# Patient Record
Sex: MALE | Race: WHITE | NOT HISPANIC OR LATINO | Employment: UNEMPLOYED | URBAN - METROPOLITAN AREA
[De-identification: names, ages, dates, MRNs, and addresses within clinical notes are randomized per-mention and may not be internally consistent; named-entity substitution may affect disease eponyms.]

---

## 2017-10-19 ENCOUNTER — ALLSCRIPTS OFFICE VISIT (OUTPATIENT)
Dept: OTHER | Facility: OTHER | Age: 27
End: 2017-10-19

## 2017-10-19 DIAGNOSIS — R53.83 OTHER FATIGUE: ICD-10-CM

## 2017-11-21 ENCOUNTER — GENERIC CONVERSION - ENCOUNTER (OUTPATIENT)
Dept: OTHER | Facility: OTHER | Age: 27
End: 2017-11-21

## 2018-01-11 NOTE — PROGRESS NOTES
Assessment    1  Encounter for preventive health examination (V70 0) (Z00 00)   2  Cigarette smoker (305 1) (F17 210)   3  Current every day smoker (305 1) (F17 200)   4  Fatigue (780 79) (R53 83)   5  Encounter for preventive health examination (V70 0) (Z00 00)    Plan  Fatigue    · (1) CBC/PLT/DIFF; Status:Active; Requested [id number];    · (1) COMPREHENSIVE METABOLIC PANEL; Status:Active; Requested [id number];    · (1) LIPID PANEL, FASTING; Status:Active; Requested [id number];    · (1) T4, FREE; Status:Active; Requested [id number];    · (1) TSH; Status:Active; Requested [id number];    · (1) URIC ACID; Status:Active; Requested [id number];     Discussion/Summary    Physical exam completed  Smoking cessation discussed, pt rolls his own cigarettes  Paperwork for [facility] completed  Labwork ordered  Follow up in 1 mos to review labwork    The patient was counseled regarding risks and benefits of treatment options  total time of encounter was 30 min minutes and 10 min minutes was spent counseling  Possible side effects of new medications were reviewed with the patient/guardian today  The treatment plan was reviewed with the patient/guardian  The patient/guardian understands and agrees with the treatment plan      Chief Complaint  Needs annual physical      History of Present Illness  HM, Adult Male: The patient is being seen for a health maintenance evaluation  The last health maintenance visit was no PCP  General Health: The patient's health since the last visit is described as good  He does not have regular dental visits  He denies vision problems  He denies hearing loss  Lifestyle:  He consumes a diverse and healthy diet  He does not have any weight concerns  He does not exercise regularly  He uses tobacco  He denies alcohol use  He denies drug use  Reproductive health:  the patient is sexually active  He denies erectile dysfunction     Screening:   HPI: Pt here to establish care,  completion of health forms for [facility] reports no complaints or medical problems is on no medication, is full time employee of [employer] firm  lives with spouse and 1 yr old son, is seeking custody of his own son in foster care in [place]       Review of Systems    Constitutional: no fever, not feeling poorly, no chills and not feeling tired  Eyes: eyesight problems and wears glasses  ENT: no earache, no sore throat, no hearing loss and no hoarseness  Cardiovascular: no chest pain, no palpitations and no extremity edema  Respiratory: no shortness of breath, no cough, no wheezing and no shortness of breath during exertion  Gastrointestinal: no abdominal pain, no nausea and no diarrhea  Genitourinary: no dysuria and no urinary hesitancy  Musculoskeletal: no arthralgias, no joint swelling, no myalgias and no joint stiffness  The patient presents with complaints of a rash (skin rash intermittently beneath his breasts)  Neurological: no headache, no confusion and no fainting  Psychiatric: no anxiety, no sleep disturbances and no depression  Endocrine: No complaints of proptosis, no hot flashes, no muscle weakness, no erectile dysfunction, no deepening of the voice, no feelings of weakness  Hematologic/Lymphatic: no swollen glands and no swollen glands in the neck  ROS reviewed  Family History  Mother    · Family history of lung cancer (V16 1) (Z80 1)  Father    · Family history of type 2 diabetes mellitus (V18 0) (Z83 3)    Social History    · Cigarette smoker (305 1) (F17 210)   · Current every day smoker (305 1) (F17 200)   · Employed   ·    · No illicit drug use   · Social drinker (V49 89) (Z78 9)   · Two children    Current Meds   1   No Reported Medications Recorded    Vitals   Recorded: 19Oct2017 10:47AM   Temperature 96 8 F   Heart Rate 65   Systolic 576   Diastolic 80   Height 6 ft    Weight 243 lb    BMI Calculated 32 96   BSA Calculated 2 31   O2 Saturation 98     Physical Exam    Constitutional   General appearance: Abnormal   appears healthy, normal body odor and overweight  c/o pain related to poor dentition  Head and Face   Head and face: Normal     Palpation of the face and sinuses: No sinus tenderness  Eyes   Conjunctiva and lids: No erythema, swelling or discharge  Pupils and irises: Equal, round, reactive to light  Ophthalmoscopic examination: Normal fundi and optic discs  Ears, Nose, Mouth, and Throat   External inspection of ears and nose: Normal     Otoscopic examination: Tympanic membranes translucent with normal light reflex  Canals patent without erythema  Hearing: Normal     Nasal mucosa, septum, and turbinates: Normal without edema or erythema  Lips, teeth, and gums: Normal, good dentition  Oropharynx: Normal with no erythema, edema, exudate or lesions  Neck   Neck: Supple, symmetric, trachea midline, no masses  Thyroid: Normal, no thyromegaly  Pulmonary   Respiratory effort: No increased work of breathing or signs of respiratory distress  Percussion of chest: Normal     Palpation of chest: Normal     Auscultation of lungs: Clear to auscultation  Cardiovascular   Palpation of heart: Normal PMI, no thrills  Auscultation of heart: Normal rate and rhythm, normal S1 and S2, no murmurs  The heart rate was normal  Heart sounds: normal S1, normal S2, no S3 and no S4  no murmurs were heard  Chest   Chest: Normal     Abdomen   Abdomen: Non-tender, no masses  Liver and spleen: No hepatomegaly or splenomegaly  Lymphatic   Palpation of lymph nodes in neck: No lymphadenopathy  Musculoskeletal   Gait and station: Normal     Inspection/palpation of digits and nails: Normal without clubbing or cyanosis      Inspection/palpation of joints, bones, and muscles: Normal     Range of motion: Normal     Stability: Normal     Muscle strength/tone: Normal     Skin   Skin and subcutaneous tissue: Normal without rashes or lesions  Examination of the skin for lesions: Abnormal   Raised mole on pts scalp line L side and Abd  Examination for skin lesions: Abnormal   Skin Lesions: Actinic Keratosis: pink cm pink, scaling macules/papules noted  Palpation of skin and subcutaneous tissue: Normal turgor  Neurologic   Cranial nerves: Cranial nerves 2-12 intact  Psychiatric   Judgment and insight: Normal     Orientation to person, place and time: Normal     Recent and remote memory: Intact      Mood and affect: Normal        Future Appointments    Date/Time Provider Specialty Site   11/21/2017 10:30 AM Candiss Lesches, [address]  Internal Medicine [facility]     Signatures   Electronically signed by : Erika Thompson; Oct 19 2017 12:23PM EST                       (Author)    Electronically signed by : Kyle Baldwin DO; Oct 19 2017  4:44PM EST

## 2018-01-12 NOTE — MISCELLANEOUS
Provider Comments  Provider Comments:   Patient was a no show for his appointment today        Signatures   Electronically signed by : Travis Hare DO; Nov 22 2017 10:55AM EST

## 2018-01-14 VITALS
WEIGHT: 243 LBS | HEART RATE: 65 BPM | SYSTOLIC BLOOD PRESSURE: 120 MMHG | OXYGEN SATURATION: 98 % | BODY MASS INDEX: 32.91 KG/M2 | HEIGHT: 72 IN | DIASTOLIC BLOOD PRESSURE: 80 MMHG | TEMPERATURE: 96.8 F

## 2018-02-05 ENCOUNTER — APPOINTMENT (OUTPATIENT)
Dept: LAB | Facility: CLINIC | Age: 28
End: 2018-02-05
Payer: COMMERCIAL

## 2018-02-05 DIAGNOSIS — R53.83 OTHER FATIGUE: ICD-10-CM

## 2018-02-05 LAB
ALBUMIN SERPL BCP-MCNC: 4 G/DL (ref 3.5–5)
ALP SERPL-CCNC: 81 U/L (ref 46–116)
ALT SERPL W P-5'-P-CCNC: 40 U/L (ref 12–78)
ANION GAP SERPL CALCULATED.3IONS-SCNC: 5 MMOL/L (ref 4–13)
AST SERPL W P-5'-P-CCNC: 16 U/L (ref 5–45)
BASOPHILS # BLD AUTO: 0.02 THOUSANDS/ΜL (ref 0–0.1)
BASOPHILS NFR BLD AUTO: 0 % (ref 0–1)
BILIRUB SERPL-MCNC: 0.38 MG/DL (ref 0.2–1)
BUN SERPL-MCNC: 8 MG/DL (ref 5–25)
CALCIUM SERPL-MCNC: 8.8 MG/DL (ref 8.3–10.1)
CHLORIDE SERPL-SCNC: 107 MMOL/L (ref 100–108)
CHOLEST SERPL-MCNC: 159 MG/DL (ref 50–200)
CO2 SERPL-SCNC: 28 MMOL/L (ref 21–32)
CREAT SERPL-MCNC: 0.95 MG/DL (ref 0.6–1.3)
EOSINOPHIL # BLD AUTO: 0.21 THOUSAND/ΜL (ref 0–0.61)
EOSINOPHIL NFR BLD AUTO: 3 % (ref 0–6)
ERYTHROCYTE [DISTWIDTH] IN BLOOD BY AUTOMATED COUNT: 12.9 % (ref 11.6–15.1)
GFR SERPL CREATININE-BSD FRML MDRD: 108 ML/MIN/1.73SQ M
GLUCOSE P FAST SERPL-MCNC: 91 MG/DL (ref 65–99)
HCT VFR BLD AUTO: 49.8 % (ref 36.5–49.3)
HDLC SERPL-MCNC: 41 MG/DL (ref 40–60)
HGB BLD-MCNC: 16.7 G/DL (ref 12–17)
LDLC SERPL CALC-MCNC: 100 MG/DL (ref 0–100)
LYMPHOCYTES # BLD AUTO: 2.36 THOUSANDS/ΜL (ref 0.6–4.47)
LYMPHOCYTES NFR BLD AUTO: 29 % (ref 14–44)
MCH RBC QN AUTO: 30.9 PG (ref 26.8–34.3)
MCHC RBC AUTO-ENTMCNC: 33.5 G/DL (ref 31.4–37.4)
MCV RBC AUTO: 92 FL (ref 82–98)
MONOCYTES # BLD AUTO: 0.95 THOUSAND/ΜL (ref 0.17–1.22)
MONOCYTES NFR BLD AUTO: 12 % (ref 4–12)
NEUTROPHILS # BLD AUTO: 4.6 THOUSANDS/ΜL (ref 1.85–7.62)
NEUTS SEG NFR BLD AUTO: 56 % (ref 43–75)
NRBC BLD AUTO-RTO: 0 /100 WBCS
PLATELET # BLD AUTO: 333 THOUSANDS/UL (ref 149–390)
PMV BLD AUTO: 9.5 FL (ref 8.9–12.7)
POTASSIUM SERPL-SCNC: 4.7 MMOL/L (ref 3.5–5.3)
PROT SERPL-MCNC: 7.3 G/DL (ref 6.4–8.2)
RBC # BLD AUTO: 5.41 MILLION/UL (ref 3.88–5.62)
SODIUM SERPL-SCNC: 140 MMOL/L (ref 136–145)
T4 FREE SERPL-MCNC: 1.01 NG/DL (ref 0.76–1.46)
TRIGL SERPL-MCNC: 90 MG/DL
TSH SERPL DL<=0.05 MIU/L-ACNC: 0.46 UIU/ML (ref 0.36–3.74)
URATE SERPL-MCNC: 4.7 MG/DL (ref 4.2–8)
WBC # BLD AUTO: 8.16 THOUSAND/UL (ref 4.31–10.16)

## 2018-02-05 PROCEDURE — 85025 COMPLETE CBC W/AUTO DIFF WBC: CPT

## 2018-02-05 PROCEDURE — 80053 COMPREHEN METABOLIC PANEL: CPT

## 2018-02-05 PROCEDURE — 84443 ASSAY THYROID STIM HORMONE: CPT

## 2018-02-05 PROCEDURE — 80061 LIPID PANEL: CPT

## 2018-02-05 PROCEDURE — 84439 ASSAY OF FREE THYROXINE: CPT

## 2018-02-05 PROCEDURE — 84550 ASSAY OF BLOOD/URIC ACID: CPT

## 2018-02-05 PROCEDURE — 36415 COLL VENOUS BLD VENIPUNCTURE: CPT

## 2018-02-06 ENCOUNTER — OFFICE VISIT (OUTPATIENT)
Dept: FAMILY MEDICINE CLINIC | Facility: CLINIC | Age: 28
End: 2018-02-06
Payer: COMMERCIAL

## 2018-02-06 VITALS
TEMPERATURE: 98.8 F | HEIGHT: 74 IN | HEART RATE: 64 BPM | BODY MASS INDEX: 32.6 KG/M2 | SYSTOLIC BLOOD PRESSURE: 140 MMHG | DIASTOLIC BLOOD PRESSURE: 80 MMHG | OXYGEN SATURATION: 98 % | WEIGHT: 254 LBS

## 2018-02-06 DIAGNOSIS — F32.1 MODERATE SINGLE CURRENT EPISODE OF MAJOR DEPRESSIVE DISORDER (HCC): ICD-10-CM

## 2018-02-06 DIAGNOSIS — F17.200 SMOKING: ICD-10-CM

## 2018-02-06 DIAGNOSIS — R07.9 CHEST PAIN, UNSPECIFIED TYPE: Primary | ICD-10-CM

## 2018-02-06 DIAGNOSIS — R00.2 HEART PALPITATIONS: ICD-10-CM

## 2018-02-06 DIAGNOSIS — F12.10 DRUG ABUSE, MARIJUANA: ICD-10-CM

## 2018-02-06 PROCEDURE — 99214 OFFICE O/P EST MOD 30 MIN: CPT | Performed by: FAMILY MEDICINE

## 2018-02-06 NOTE — LETTER
February 6, 2018     Patient: Corinne Lobo   YOB: 1990   Date of Visit: 2/6/2018       To Whom it May Concern:    Chuyitataylor Garima is under my professional care  He was seen in my office on 2/6/2018  He may return to work on 02/07/2018  Excuse from work 02/5/2018-02/06/2018    If you have any questions or concerns, please don't hesitate to call           Sincerely,          Anastasia Vargas DO        CC: No Recipients

## 2018-02-06 NOTE — PROGRESS NOTES
Assessment/Plan:    No problem-specific Assessment & Plan notes found for this encounter  Diagnoses and all orders for this visit:    Chest pain, unspecified type  -     Echo stress test w contrast if indicated; Future    Drug abuse, marijuana    Heart palpitations    Smoking  -     XR chest pa & lateral; Future    Moderate single current episode of major depressive disorder (HCC)  -     sertraline (ZOLOFT) 50 mg tablet; Take 1 tablet (50 mg total) by mouth daily          Subjective:      Patient ID: Cynthia España is a 28 y o  male  Pt complains of headaches, fatigue, nausea, when he withdrawls from smoking marajana, pt is used to smoking marajana daily and he has depression and heart palpitations within 24hrs of not smoking "weed", pt has been using Valeriy daily since the age of 14        The following portions of the patient's history were reviewed and updated as appropriate: allergies, current medications, past family history, past medical history, past social history, past surgical history and problem list     Review of Systems   Constitutional: Negative for chills, fever and unexpected weight change  Cardiovascular: Positive for chest pain and palpitations  Objective:     Physical Exam   Constitutional: He is oriented to person, place, and time  He appears well-developed and well-nourished  No distress  HENT:   Head: Normocephalic and atraumatic  Neck: Normal range of motion  Neck supple  Cardiovascular: Normal rate, regular rhythm and normal heart sounds  No murmur heard  Pulmonary/Chest: Effort normal and breath sounds normal  No stridor  No respiratory distress  He has no wheezes  He has no rales  Lymphadenopathy:     He has no cervical adenopathy  Neurological: He is alert and oriented to person, place, and time  Skin: Skin is warm and dry  No rash noted  He is not diaphoretic  No erythema  No pallor  Psychiatric: He has a normal mood and affect   His behavior is normal  Judgment and thought content normal    Nursing note and vitals reviewed

## 2018-03-08 ENCOUNTER — OFFICE VISIT (OUTPATIENT)
Dept: FAMILY MEDICINE CLINIC | Facility: CLINIC | Age: 28
End: 2018-03-08
Payer: COMMERCIAL

## 2018-03-08 VITALS
DIASTOLIC BLOOD PRESSURE: 80 MMHG | SYSTOLIC BLOOD PRESSURE: 128 MMHG | HEART RATE: 74 BPM | TEMPERATURE: 98.8 F | HEIGHT: 74 IN | WEIGHT: 249.25 LBS | BODY MASS INDEX: 31.99 KG/M2 | OXYGEN SATURATION: 97 %

## 2018-03-08 DIAGNOSIS — F17.200 SMOKING: ICD-10-CM

## 2018-03-08 DIAGNOSIS — F12.10 DRUG ABUSE, MARIJUANA: ICD-10-CM

## 2018-03-08 DIAGNOSIS — F32.1 MODERATE SINGLE CURRENT EPISODE OF MAJOR DEPRESSIVE DISORDER (HCC): ICD-10-CM

## 2018-03-08 DIAGNOSIS — R07.9 CHEST PAIN, UNSPECIFIED TYPE: Primary | ICD-10-CM

## 2018-03-08 PROCEDURE — 99214 OFFICE O/P EST MOD 30 MIN: CPT | Performed by: FAMILY MEDICINE

## 2018-03-08 NOTE — PROGRESS NOTES
Assessment/Plan:    No problem-specific Assessment & Plan notes found for this encounter  Diagnoses and all orders for this visit:    Chest pain, unspecified type  Comments:  anxiety related but pt had a negative stress test    Drug abuse, marijuana  Comments:  pt is not currently using marijuana in over 2 weeks, continue zoloft    Moderate single current episode of major depressive disorder ([facility] [address] )  Comments:  marked improvement with zoloft pt does not want to increase it at this time, kevin in 6 weeks    Smoking  Comments:  pt counseled on quitting smoking          Subjective:      Patient ID: Corinne Lobo is a 28 y o  male  Follow up for chest pain pt was sent for a stress echo test the test was negative, pt does get mild chest pain when he is overly excitied, follow up for drug abuse pt has been a daily marijuana user, pt has not smoked marijuana in over 2 weeks since starting the zoloft, follow up for depression pt was placed on zoloft 50mg daily at last visit, pt is doing better on the zoloft but is not 100%, pt continues to smoke cigarettes        The following portions of the patient's history were reviewed and updated as appropriate: allergies, current medications, past family history, past medical history, past social history, past surgical history and problem list     Review of Systems   Constitutional: Negative for chills, fever and unexpected weight change  Respiratory: Negative for shortness of breath and wheezing  Gastrointestinal: Negative for nausea and vomiting  Objective:      /80   Pulse 74   Temp 98 8 °F (37 1 °C)   Ht 6' 2" (1 88 m)   Wt 113 kg (249 lb 4 oz)   SpO2 97%   BMI 32 00 kg/m²          Physical Exam   Constitutional: He is oriented to person, place, and time  He appears well-developed and well-nourished  No distress  HENT:   Head: Normocephalic and atraumatic     Right Ear: External ear normal    Left Ear: External ear normal    Nose: Nose normal  Mouth/Throat: Oropharynx is clear and moist    Eyes: Conjunctivae and EOM are normal  Pupils are equal, round, and reactive to light  No scleral icterus  Neck: Normal range of motion  Neck supple  Cardiovascular: Normal rate, regular rhythm and normal heart sounds  Exam reveals no friction rub  No murmur heard  Pulmonary/Chest: Effort normal and breath sounds normal  No respiratory distress  He has no wheezes  He has no rales  Abdominal: Soft  Bowel sounds are normal  He exhibits no distension and no mass  There is no tenderness  There is no rebound and no guarding  Musculoskeletal: Normal range of motion  He exhibits no edema  Lymphadenopathy:     He has no cervical adenopathy  Neurological: He is alert and oriented to person, place, and time  He has normal reflexes  Skin: Skin is warm and dry  No rash noted  He is not diaphoretic  No erythema  No pallor  Psychiatric: He has a normal mood and affect   His behavior is normal  Judgment and thought content normal

## 2018-03-08 NOTE — PATIENT INSTRUCTIONS

## 2018-07-03 ENCOUNTER — OFFICE VISIT (OUTPATIENT)
Dept: FAMILY MEDICINE CLINIC | Facility: CLINIC | Age: 28
End: 2018-07-03
Payer: COMMERCIAL

## 2018-07-03 VITALS
SYSTOLIC BLOOD PRESSURE: 124 MMHG | WEIGHT: 241 LBS | TEMPERATURE: 99 F | BODY MASS INDEX: 30.93 KG/M2 | OXYGEN SATURATION: 96 % | DIASTOLIC BLOOD PRESSURE: 76 MMHG | HEIGHT: 74 IN | HEART RATE: 81 BPM

## 2018-07-03 DIAGNOSIS — E78.5 HYPERLIPIDEMIA, UNSPECIFIED HYPERLIPIDEMIA TYPE: ICD-10-CM

## 2018-07-03 DIAGNOSIS — Z13.1 SCREENING FOR DIABETES MELLITUS: ICD-10-CM

## 2018-07-03 DIAGNOSIS — R19.7 DIARRHEA, UNSPECIFIED TYPE: ICD-10-CM

## 2018-07-03 DIAGNOSIS — R53.83 OTHER FATIGUE: ICD-10-CM

## 2018-07-03 DIAGNOSIS — K08.89 TOOTH PAIN: ICD-10-CM

## 2018-07-03 DIAGNOSIS — E55.9 VITAMIN D DEFICIENCY: ICD-10-CM

## 2018-07-03 DIAGNOSIS — R12 HEARTBURN: ICD-10-CM

## 2018-07-03 DIAGNOSIS — R10.9 STOMACH PAIN: Primary | ICD-10-CM

## 2018-07-03 PROCEDURE — 99214 OFFICE O/P EST MOD 30 MIN: CPT | Performed by: NURSE PRACTITIONER

## 2018-07-03 RX ORDER — OMEPRAZOLE 20 MG/1
20 CAPSULE, DELAYED RELEASE ORAL DAILY
Qty: 30 CAPSULE | Refills: 5 | Status: SHIPPED | OUTPATIENT
Start: 2018-07-03

## 2018-07-03 RX ORDER — IBUPROFEN 800 MG/1
800 TABLET ORAL EVERY 8 HOURS PRN
Qty: 60 TABLET | Refills: 2 | Status: SHIPPED | OUTPATIENT
Start: 2018-07-03 | End: 2018-09-13

## 2018-07-03 NOTE — PROGRESS NOTES
Assessment/Plan:    No problem-specific Assessment & Plan notes found for this encounter  Diagnoses and all orders for this visit:    Stomach pain  Comments:  gastrointestinal virus instructions reviewed with pt  Rx for Prilosec also provided  Orders:  -     omeprazole (PriLOSEC) 20 mg delayed release capsule; Take 1 capsule (20 mg total) by mouth daily    Heartburn  Comments:    Rx for Prilosec provided  Orders:  -     omeprazole (PriLOSEC) 20 mg delayed release capsule; Take 1 capsule (20 mg total) by mouth daily    Tooth pain  Comments:  Rx for Ibuprofen provided, pt reports he is f/u with [facility] for tooth extraction  Orders:  -     ibuprofen (MOTRIN) 800 mg tablet; Take 1 tablet (800 mg total) by mouth every 8 (eight) hours as needed for mild pain for up to 15 days    Diarrhea, unspecified type  Comments:  Pt reports 2 days of watery diarrhea that has resolved today  GI viral diet reviewed    Hyperlipidemia, unspecified hyperlipidemia type  Comments:  labwork ordered  Orders:  -     Lipid panel; Future    Vitamin D deficiency  Comments:  labwork ordered  Orders:  -     Vitamin D 25 hydroxy; Future    Other fatigue  Comments:  labwork ordered  Orders:  -     TSH baseline  -     CBC and differential  -     UA w Reflex to Microscopic w Reflex to Culture - Clinic Collect    Screening for diabetes mellitus  Comments:  labwork ordered  Orders:  -     Comprehensive metabolic panel          Subjective:      Patient ID: Jason Fernandes is a 33 y o  male here for abdominal pain-suspect viral syndrome, ie diarrhea X2 days, 1 episode of vomiting, excess belching "smells like sulfur"  HPI    The following portions of the patient's history were reviewed and updated as appropriate:   He  has no past medical history on file  He   Patient Active Problem List    Diagnosis Date Noted    Fatigue 10/19/2017     He  has no past surgical history on file    His family history includes Diabetes type II in his father; Lung cancer in his mother  He  reports that he has been smoking Cigarettes  He has been smoking about 0 50 packs per day  He has never used smokeless tobacco  He reports that he does not drink alcohol or use drugs  Current Outpatient Prescriptions   Medication Sig Dispense Refill    ibuprofen (MOTRIN) 800 mg tablet Take 1 tablet (800 mg total) by mouth every 8 (eight) hours as needed for mild pain for up to 15 days 60 tablet 2    omeprazole (PriLOSEC) 20 mg delayed release capsule Take 1 capsule (20 mg total) by mouth daily 30 capsule 5     No current facility-administered medications for this visit  Current Outpatient Prescriptions on File Prior to Visit   Medication Sig    [DISCONTINUED] sertraline (ZOLOFT) 50 mg tablet Take 1 tablet (50 mg total) by mouth daily     No current facility-administered medications on file prior to visit  He is allergic to codeine and cyclobenzaprine       Review of Systems   Constitutional: Negative  Negative for appetite change and fatigue  Molar teeth pain, pt reports he needs 9 teeth pulled   HENT: Negative  Negative for congestion, ear pain, rhinorrhea and sinus pain  Eyes: Negative  Negative for pain, itching and visual disturbance  Respiratory: Negative  Negative for cough, shortness of breath and wheezing  Cardiovascular: Negative  Negative for chest pain, palpitations and leg swelling  Gastrointestinal: Positive for abdominal pain and diarrhea  Negative for constipation, nausea and vomiting  Excess belching   Endocrine: Negative  Negative for polydipsia, polyphagia and polyuria  Genitourinary: Negative  Negative for difficulty urinating, frequency and urgency  Musculoskeletal: Negative  Negative for back pain and joint swelling  Skin: Negative  Negative for rash and wound  Allergic/Immunologic: Negative  Neurological: Negative  Negative for dizziness, weakness and headaches  Hematological: Negative  Negative for adenopathy  Does not bruise/bleed easily  Psychiatric/Behavioral: Negative  Negative for behavioral problems and sleep disturbance  The patient is not nervous/anxious  Objective:      /76 (BP Location: Left arm, Patient Position: Sitting, Cuff Size: Adult)   Pulse 81   Temp 99 °F (37 2 °C) (Tympanic)   Ht 6' 2" (1 88 m)   Wt 109 kg (241 lb)   SpO2 96%   BMI 30 94 kg/m²          Physical Exam   Constitutional: He is oriented to person, place, and time  He appears well-developed and well-nourished  Multiple broken teeth noted   HENT:   Head: Normocephalic  Eyes: Conjunctivae and EOM are normal  Pupils are equal, round, and reactive to light  Neck: Normal range of motion  Neck supple  Cardiovascular: Normal rate and regular rhythm  Pulmonary/Chest: Effort normal and breath sounds normal    Abdominal: Soft  Bowel sounds are normal  There is no tenderness  Musculoskeletal: Normal range of motion  Neurological: He is alert and oriented to person, place, and time  Skin: Skin is warm and dry  Psychiatric: He has a normal mood and affect   His behavior is normal  Judgment and thought content normal

## 2018-07-03 NOTE — PATIENT INSTRUCTIONS
Acute Nausea and Vomiting   WHAT YOU NEED TO KNOW:   Acute nausea and vomiting start suddenly, worsen quickly, and last a short time  DISCHARGE INSTRUCTIONS:   Return to the emergency department if:   · You see blood in your vomit or your bowel movements  · You have sudden, severe pain in your chest and upper abdomen after hard vomiting or retching  · You have swelling in your neck and chest      · You are dizzy, cold, and thirsty and your eyes and mouth are dry  · You are urinating very little or not at all  · You have muscle weakness, leg cramps, and trouble breathing  · Your heart is beating much faster than normal      · You continue to vomit for more than 48 hours  Contact your healthcare provider if:   · You have frequent dry heaves (vomiting but nothing comes out)  · Your nausea and vomiting does not get better or go away after you use medicine  · You have questions or concerns about your condition or treatment  Medicines: You may need any of the following:  · Medicines  may be given to calm your stomach and stop your vomiting  You may also need medicines to help you feel more relaxed or to stop nausea and vomiting caused by motion sickness  · Gastrointestinal stimulants  are used to help empty your stomach and bowels  This may help decrease nausea and vomiting  · Take your medicine as directed  Contact your healthcare provider if you think your medicine is not helping or if you have side effects  Tell him or her if you are allergic to any medicine  Keep a list of the medicines, vitamins, and herbs you take  Include the amounts, and when and why you take them  Bring the list or the pill bottles to follow-up visits  Carry your medicine list with you in case of an emergency  Prevent or manage acute nausea and vomiting:   · Do not drink alcohol  Alcohol may upset or irritate your stomach  Too much alcohol can also cause acute nausea and vomiting  · Control stress    Headaches due to stress may cause nausea and vomiting  Find ways to relax and manage your stress  Get more rest and sleep  · Drink more liquids as directed  Vomiting can lead to dehydration  It is important to drink more liquids to help replace lost body fluids  Ask your healthcare provider how much liquid to drink each day and which liquids are best for you  Your provider may recommend that you drink an oral rehydration solution (ORS)  ORS contains water, salts, and sugar that are needed to replace the lost body fluids  Ask what kind of ORS to use, how much to drink, and where to get it  · Eat smaller meals, more often  Eat small amounts of food every 2 to 3 hours, even if you are not hungry  Food in your stomach may decrease your nausea  · Talk to your healthcare provider before you take over-the-counter (OTC) medicines  These medicines can cause serious problems if you use certain other medicines, or you have a medical condition  You may have problems if you use too much or use them for longer than the label says  Follow directions on the label carefully  Follow up with your healthcare provider as directed:  Write down your questions so you remember to ask them during your follow-up visits  © 2017 [address] [employer] [employer] Information is for End User's use only and may not be sold, redistributed or otherwise used for commercial purposes  All illustrations and images included in CareNotes® are the copyrighted property of [employer]  or Killian [employer]en  The above information is an  only  It is not intended as medical advice for individual conditions or treatments  Talk to your doctor, nurse or pharmacist before following any medical regimen to see if it is safe and effective for you

## 2018-07-03 NOTE — LETTER
July 3, 2018     Patient: Aurea Johnson   YOB: 1990   Date of Visit: 7/3/2018       To Whom it May Concern:    Josee Prashanth is under my professional care  He was seen in my office on due to acute illness on  7/3/2018  He may return to work on 7/5/18  If you have any questions or concerns, please don't hesitate to call           Sincerely,          Dean Husbands, BARBIE        CC: No Recipients

## 2018-07-31 ENCOUNTER — OFFICE VISIT (OUTPATIENT)
Dept: FAMILY MEDICINE CLINIC | Facility: CLINIC | Age: 28
End: 2018-07-31
Payer: COMMERCIAL

## 2018-07-31 VITALS
BODY MASS INDEX: 30.72 KG/M2 | DIASTOLIC BLOOD PRESSURE: 72 MMHG | WEIGHT: 239.4 LBS | TEMPERATURE: 96.9 F | OXYGEN SATURATION: 99 % | HEART RATE: 55 BPM | HEIGHT: 74 IN | SYSTOLIC BLOOD PRESSURE: 122 MMHG

## 2018-07-31 DIAGNOSIS — R11.2 NON-INTRACTABLE VOMITING WITH NAUSEA, UNSPECIFIED VOMITING TYPE: ICD-10-CM

## 2018-07-31 DIAGNOSIS — R19.7 DIARRHEA OF PRESUMED INFECTIOUS ORIGIN: ICD-10-CM

## 2018-07-31 DIAGNOSIS — K52.9 GASTROENTERITIS: Primary | ICD-10-CM

## 2018-07-31 PROCEDURE — 3008F BODY MASS INDEX DOCD: CPT | Performed by: NURSE PRACTITIONER

## 2018-07-31 PROCEDURE — 99213 OFFICE O/P EST LOW 20 MIN: CPT | Performed by: NURSE PRACTITIONER

## 2018-07-31 NOTE — PROGRESS NOTES
Assessment/Plan:    No problem-specific Assessment & Plan notes found for this encounter  Diagnoses and all orders for this visit:    Gastroenteritis  Comments:  24 hr viral in nature, no fever, episodic vomiting and diarrhea which have resolved today  Pt tolerated lunch w/o difficulty    Non-intractable vomiting with nausea, unspecified vomiting type  Comments:  Pt reports episodic vomiting X24 hrs that has since resolved today, pt needs note for his employer regarding absence from work    Diarrhea of presumed infectious origin  Comments:  episodic diarrhea yesterday X1 today , no further complaints pt tolerated his lunch w/o further diarrhea          Subjective:      Patient ID: Sivan Gutierrez is a 33 y o  male  HPI    Pt is here for vomiting, sweats and diarrhea began yesterday in the am  Pt reports he awoke with "diaphoresis" vomited and diarrhea all day yesterday which has since resolved today      The following portions of the patient's history were reviewed and updated as appropriate:   He  has no past medical history on file  He   Patient Active Problem List    Diagnosis Date Noted    Non-intractable vomiting with nausea 07/31/2018    Diarrhea of presumed infectious origin 07/31/2018    Fatigue 10/19/2017     He  has no past surgical history on file  His family history includes Diabetes type II in his father; Lung cancer in his mother  He  reports that he has been smoking Cigarettes  He has been smoking about 0 50 packs per day  He has never used smokeless tobacco  He reports that he drinks alcohol  He reports that he does not use drugs    Current Outpatient Prescriptions   Medication Sig Dispense Refill    omeprazole (PriLOSEC) 20 mg delayed release capsule Take 1 capsule (20 mg total) by mouth daily 30 capsule 5    ibuprofen (MOTRIN) 800 mg tablet Take 1 tablet (800 mg total) by mouth every 8 (eight) hours as needed for mild pain for up to 15 days 60 tablet 2     No current facility-administered medications for this visit  Current Outpatient Prescriptions on File Prior to Visit   Medication Sig    omeprazole (PriLOSEC) 20 mg delayed release capsule Take 1 capsule (20 mg total) by mouth daily    ibuprofen (MOTRIN) 800 mg tablet Take 1 tablet (800 mg total) by mouth every 8 (eight) hours as needed for mild pain for up to 15 days     No current facility-administered medications on file prior to visit  He is allergic to codeine and cyclobenzaprine       Review of Systems   Constitutional: Negative  Negative for appetite change and fatigue  HENT: Negative  Negative for congestion, ear pain, rhinorrhea and sinus pain  Eyes: Negative  Negative for pain, itching and visual disturbance  Respiratory: Negative  Negative for cough, shortness of breath and wheezing  Cardiovascular: Negative  Negative for chest pain, palpitations and leg swelling  Gastrointestinal: Positive for diarrhea and nausea  Negative for abdominal pain, constipation and vomiting  Endocrine: Negative  Negative for polydipsia, polyphagia and polyuria  Genitourinary: Negative  Negative for difficulty urinating, frequency and urgency  Musculoskeletal: Negative  Negative for back pain and joint swelling  Skin: Negative  Negative for rash and wound  Allergic/Immunologic: Negative  Neurological: Negative  Negative for dizziness, weakness and headaches  Hematological: Negative  Negative for adenopathy  Does not bruise/bleed easily  Psychiatric/Behavioral: Negative  Negative for behavioral problems and sleep disturbance  The patient is not nervous/anxious  Objective:      /72 (BP Location: Left arm, Patient Position: Sitting, Cuff Size: Standard)   Pulse 55   Temp (!) 96 9 °F (36 1 °C) (Tympanic)   Ht 6' 2" (1 88 m)   Wt 109 kg (239 lb 6 4 oz)   SpO2 99%   BMI 30 74 kg/m²          Physical Exam   Constitutional: He is oriented to person, place, and time   He appears well-developed and well-nourished  HENT:   Head: Normocephalic  Eyes: Conjunctivae and EOM are normal  Pupils are equal, round, and reactive to light  Neck: Normal range of motion  Neck supple  Cardiovascular: Normal rate and regular rhythm  Pulmonary/Chest: Effort normal and breath sounds normal    Abdominal: Soft  Bowel sounds are normal    Musculoskeletal: Normal range of motion  Neurological: He is alert and oriented to person, place, and time  Skin: Skin is warm and dry  Psychiatric: He has a normal mood and affect   His behavior is normal  Judgment and thought content normal

## 2018-07-31 NOTE — PATIENT INSTRUCTIONS
Gastroenteritis   WHAT YOU NEED TO KNOW:   Gastroenteritis, or stomach flu, is an infection of the stomach and intestines  DISCHARGE INSTRUCTIONS:   Call 911 for any of the following:   · You have trouble breathing or a very fast pulse  Return to the emergency department if:   · You see blood in your diarrhea  · You cannot stop vomiting  · You have not urinated for 12 hours  · You feel like you are going to faint  Contact your healthcare provider if:   · You have a fever  · You continue to vomit or have diarrhea, even after treatment  · You see worms in your diarrhea  · Your mouth or eyes are dry  You are not urinating as much or as often  · You have questions or concerns about your condition or care  Medicines:   · Medicines  may be given to stop vomiting or diarrhea, decrease abdominal cramps, or treat an infection  · Take your medicine as directed  Contact your healthcare provider if you think your medicine is not helping or if you have side effects  Tell him or her if you are allergic to any medicine  Keep a list of the medicines, vitamins, and herbs you take  Include the amounts, and when and why you take them  Bring the list or the pill bottles to follow-up visits  Carry your medicine list with you in case of an emergency  Manage your symptoms:   · Drink liquids as directed  Ask your healthcare provider how much liquid to drink each day, and which liquids are best for you  You may also need to drink an oral rehydration solution (ORS)  An ORS has the right amounts of sugar, salt, and minerals in water to replace body fluids  · Eat bland foods  When you feel hungry, begin eating soft, bland foods  Examples are bananas, clear soup, potatoes, and applesauce  Do not have dairy products, alcohol, sugary drinks, or drinks with caffeine until you feel better  · Rest as much as possible  Slowly start to do more each day when you begin to feel better    Prevent the spread of gastroenteritis:  Gastroenteritis can spread easily  Keep yourself, your family, and your surroundings clean to help prevent the spread of gastroenteritis:  · Wash your hands often  Use soap and water  Wash your hands after you use the bathroom, change a child's diapers, or sneeze  Wash your hands before you prepare or eat food  · Clean surfaces and do laundry often  Wash your clothes and towels separately from the rest of the laundry  Clean surfaces in your home with antibacterial  or bleach  · Clean food thoroughly and cook safely  Wash raw vegetables before you cook  Cook meat, fish, and eggs fully  Do not use the same dishes for raw meat as you do for other foods  Refrigerate any leftover food immediately  · Be aware when you camp or travel  Drink only clean water  Do not drink from rivers or lakes unless you purify or boil the water first  When you travel, drink bottled water and do not add ice  Do not eat fruit that has not been peeled  Do not eat raw fish or meat that is not fully cooked  Follow up with your healthcare provider as directed:  Write down your questions so you remember to ask them during your visits  © 2017 [employer] [employer] Information is for End User's use only and may not be sold, redistributed or otherwise used for commercial purposes  All illustrations and images included in CareNotes® are the copyrighted property of A D A M , Inc  or Kill[employer]en  The above information is an  only  It is not intended as medical advice for individual conditions or treatments  Talk to your doctor, nurse or pharmacist before following any medical regimen to see if it is safe and effective for you

## 2018-07-31 NOTE — LETTER
July 31, 2018     Patient: Jason Fernandes   YOB: 1990   Date of Visit: 7/31/2018       To Whom it May Concern:    Miguel Moses is under my professional care  He was seen in my office on 7/31/2018  He may return to work on 8/1/18  Pt was suffering from Gastroenteritis on 7/30/18 and 7/31/18  If you have any questions or concerns, please don't hesitate to call           Sincerely,          BARBIE Motta        CC: No Recipients

## 2018-09-13 ENCOUNTER — HOSPITAL ENCOUNTER (EMERGENCY)
Facility: HOSPITAL | Age: 28
Discharge: HOME | End: 2018-09-13
Attending: EMERGENCY MEDICINE | Admitting: EMERGENCY MEDICINE
Payer: COMMERCIAL

## 2018-09-13 VITALS
DIASTOLIC BLOOD PRESSURE: 60 MMHG | HEIGHT: 74 IN | SYSTOLIC BLOOD PRESSURE: 148 MMHG | TEMPERATURE: 97.6 F | RESPIRATION RATE: 16 BRPM | WEIGHT: 230 LBS | HEART RATE: 58 BPM | BODY MASS INDEX: 29.52 KG/M2 | OXYGEN SATURATION: 98 %

## 2018-09-13 DIAGNOSIS — F63.81 INTERMITTENT EXPLOSIVE DISORDER: Primary | ICD-10-CM

## 2018-09-13 LAB
AMPHETAMINES SERPL QL SCN: NEGATIVE
BARBITURATES UR QL: NEGATIVE
BENZODIAZ UR QL: NEGATIVE
COCAINE UR QL: NEGATIVE
ETHANOL SERPL-MCNC: <10 MG/DL
HOLD SPECIMEN: NORMAL
HOLD SPECIMEN: NORMAL
METHADONE UR QL: NEGATIVE
OPIATES UR QL SCN: NEGATIVE
PCP UR QL: NEGATIVE
THC UR QL: POSITIVE

## 2018-09-13 PROCEDURE — 36415 COLL VENOUS BLD VENIPUNCTURE: CPT | Performed by: EMERGENCY MEDICINE

## 2018-09-13 PROCEDURE — 80307 DRUG TEST PRSMV CHEM ANLYZR: CPT | Performed by: EMERGENCY MEDICINE

## 2018-09-13 PROCEDURE — 99283 EMERGENCY DEPT VISIT LOW MDM: CPT

## 2018-09-13 PROCEDURE — 80320 DRUG SCREEN QUANTALCOHOLS: CPT | Performed by: EMERGENCY MEDICINE
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
## 2018-09-13 NOTE — DISCHARGE INSTRUCTIONS
Borderline Personality Disorder   WHAT YOU NEED TO KNOW:   Borderline personality disorder (BPD) is a pattern of thoughts and behaviors that causes most areas of your life to be unstable  Your thoughts and behaviors seem normal to you, but not to others  You often make choices that are impulsive and risky without thinking about the outcome  Your moods, thoughts, and opinions change from one extreme to the other  Treatment can help you learn to control your symptoms and may help you recover from BPD  DISCHARGE INSTRUCTIONS:   Medicines: You may need any of the following to treat symptoms that occur with BPD:  · An antidepressant  called an SSRI treats anxiety and depression  · Mood stabilizers  control mood swings and may decrease impulsive behavior  · Antipsychotics  help regulate thought and judgment, and may reduce anxiety, paranoia, and hostility  · Take your medicine as directed  Contact your healthcare provider if you think your medicine is not helping or if you have side effects  Tell him or her if you are allergic to any medicine  Keep a list of the medicines, vitamins, and herbs you take  Include the amounts, and when and why you take them  Bring the list or the pill bottles to follow-up visits  Carry your medicine list with you in case of an emergency  Follow up with your healthcare provider or psychiatrist as directed:  Write down your questions so you remember to ask them during your visits  Create a crisis plan:  Healthcare providers will help you create a crisis plan to follow if you have thoughts about hurting yourself or someone else  The plan will include the names of people to call during a crisis  Share your plan with friends and family  Ask someone to stay with you if a crisis occurs  If you cannot reach a person listed on your crisis plan, call the [address] [place] [address] at [phone]      Therapy:  Therapy helps you learn skills to control your moods and improve your relationships  You also learn how to replace negative thoughts and beliefs with positive ones  You might work alone with a therapist, or attend group therapy with others who have BPD  Manage BPD:   · Create a daily routine  Eat meals at the same time each day  Go to sleep at the same time each night  Tell your healthcare provider if you have trouble sleeping  · Reduce stress  Exercise regularly, or do other activities you enjoy  Make time to relax each day  Spend time with people and in places where you feel safe and at ease  · Set realistic goals  Your healthcare provider can help you develop short-term and long-term goals  Break large tasks into small ones so you do not feel overwhelmed  For support and more information:   · [facility]  [address]  [facility]  , [address] [facility] , [address] [place] [address]  Phone: [phone]  Phone: [phone]  Web Address: http://www yang com/  org  Contact your healthcare provider if:   · You have questions or concerns about your condition or care  You or someone close to you should seek care immediately or call 911 if:   · You lose touch with reality  You see, hear, or feel things that are not real      · You want to harm or kill yourself or someone else  © 2017 [address]  Information is for End User's use only and may not be sold, redistributed or otherwise used for commercial purposes  All illustrations and images included in CareNotes® are the copyrighted property of A D A M , Inc  or Killian Mayen  The above information is an  only  It is not intended as medical advice for individual conditions or treatments  Talk to your doctor, nurse or pharmacist before following any medical regimen to see if it is safe and effective for you

## 2018-09-13 NOTE — ED NOTES
As per ED MD Patient was provided with outpatient information for D&A and mental health treatment  Patient contracted for safety and was advised to go to the nearest ED if he feels unsafe

## 2018-09-14 ENCOUNTER — TELEPHONE (OUTPATIENT)
Dept: FAMILY MEDICINE CLINIC | Facility: CLINIC | Age: 28
End: 2018-09-14

## 2019-06-04 ENCOUNTER — OFFICE VISIT (OUTPATIENT)
Dept: URGENT CARE | Facility: CLINIC | Age: 29
End: 2019-06-04
Payer: COMMERCIAL

## 2019-06-04 VITALS
HEART RATE: 84 BPM | BODY MASS INDEX: 29.52 KG/M2 | HEIGHT: 74 IN | WEIGHT: 230 LBS | RESPIRATION RATE: 16 BRPM | OXYGEN SATURATION: 97 % | TEMPERATURE: 97.7 F | DIASTOLIC BLOOD PRESSURE: 66 MMHG | SYSTOLIC BLOOD PRESSURE: 142 MMHG

## 2019-06-04 DIAGNOSIS — K04.7 DENTAL INFECTION: Primary | ICD-10-CM

## 2019-06-04 PROCEDURE — G0382 LEV 3 HOSP TYPE B ED VISIT: HCPCS | Performed by: PHYSICIAN ASSISTANT

## 2019-06-04 PROCEDURE — 99283 EMERGENCY DEPT VISIT LOW MDM: CPT | Performed by: PHYSICIAN ASSISTANT

## 2019-06-04 PROCEDURE — 99203 OFFICE O/P NEW LOW 30 MIN: CPT | Performed by: PHYSICIAN ASSISTANT

## 2019-06-04 RX ORDER — AMOXICILLIN 875 MG/1
875 TABLET, COATED ORAL 2 TIMES DAILY
Qty: 14 TABLET | Refills: 0 | Status: SHIPPED | OUTPATIENT
Start: 2019-06-04 | End: 2019-06-11

## 2019-09-28 ENCOUNTER — HOSPITAL ENCOUNTER (EMERGENCY)
Facility: HOSPITAL | Age: 29
Discharge: HOME | End: 2019-09-28
Attending: EMERGENCY MEDICINE
Payer: COMMERCIAL

## 2019-09-28 VITALS
BODY MASS INDEX: 26.31 KG/M2 | HEART RATE: 94 BPM | WEIGHT: 205 LBS | OXYGEN SATURATION: 100 % | SYSTOLIC BLOOD PRESSURE: 138 MMHG | RESPIRATION RATE: 18 BRPM | HEIGHT: 74 IN | DIASTOLIC BLOOD PRESSURE: 84 MMHG | TEMPERATURE: 98.1 F

## 2019-09-28 DIAGNOSIS — Z00.8 ENCOUNTER FOR PSYCHOLOGICAL EVALUATION: Primary | ICD-10-CM

## 2019-09-28 LAB
AMPHETAMINES SERPL QL SCN: POSITIVE
BARBITURATES UR QL: NEGATIVE
BENZODIAZ UR QL: NEGATIVE
COCAINE UR QL: NEGATIVE
ETHANOL EXG-MCNC: 0 MG/DL
METHADONE UR QL: NEGATIVE
OPIATES UR QL SCN: NEGATIVE
PCP UR QL: NEGATIVE
THC UR QL: POSITIVE

## 2019-09-28 PROCEDURE — 99284 EMERGENCY DEPT VISIT MOD MDM: CPT

## 2019-09-28 PROCEDURE — 80307 DRUG TEST PRSMV CHEM ANLYZR: CPT | Performed by: EMERGENCY MEDICINE

## 2019-09-28 PROCEDURE — 82075 ASSAY OF BREATH ETHANOL: CPT | Performed by: EMERGENCY MEDICINE

## 2019-09-28 NOTE — ED NOTES
Patient is brought into ED by police, 490 petitioned by wife, patient had an argument with wife, told her "i'll drive my truck off a arian" and went to to his brother's house  Patient was recently having money issues with wife   Patient further states that his wife has petitioned a 302 over similar situation in the past       Kimberley Wells RN  09/28/19 5143

## 2019-09-28 NOTE — ED NOTES
Pt arrived to ED accompanied by police after wife petitioned [phone]  Wife alleged that earlier this day, pt packed his belongings in a bag and stated plans to get into his truck and drive it over the arian  [facility] met with pt and read him his rights, and provided copy  Pt is A&Ox4, calm and cooperative  Pt denies current SI, HI, AH,VH and delusions  Pt has normal appetite and fair sleep  Pt reports marital conflict over money when he asked his wife for money and she refused to give it to him  Pt also states that wife told him "nobody loves you, nobody cares about you " Pt has no prior behavioral health hospitalizations  He does not have a psychiatrist, therapist, ACT member or ICM  Pt does not take any psych meds  He attended [facility] in the past and stopped when his counselor moved to another office  Pt admits to daily cannabis use and states that it helps with his PTSD  Pt is willing to attend O/P counseling  Pt became tearful while discussing that he saw the passing of his mother in 2004, and also when he reported that his children witnessed him being handcuffed by police this evening  Pt reports that his children are a priority, that he looks forward to watching them grow up and that he wants to give them what he doesn't have  Pt does not meet criteria for I/P behavioral health and is able to contract for safety  Attending doctor d/c pt with community resources, substance abuse supports and [place] [facility] telephone support follow-up

## 2019-09-28 NOTE — ED PROVIDER NOTES
History  Chief Complaint   Patient presents with    Psychiatric Evaluation     302, told wife he was going to drive his truck off a arian, told nurse he said that in anger     PMH significant for anxiety/depression complains of gradual onset for extraction of her problem this in his personal life/family specifically arguments with his significant other that ultimately led to patient reportedly getting in his truck and making statements to the effect that he would try that in the ditch in an attempt to hurt himself just PTA, brought in under order of 302  Denies fever, rash, joint pain/swelling, headache, vision changes, hearing changes, chest pain, shortness of breath, abdominal pain and changes in bladder habits  Prior to Admission Medications   Prescriptions Last Dose Informant Patient Reported? Taking?   omeprazole (PriLOSEC) 20 mg delayed release capsule Not Taking at Unknown time  No No   Sig: Take 1 capsule (20 mg total) by mouth daily   Patient not taking: Reported on 6/4/2019      Facility-Administered Medications: None       Past Medical History:   Diagnosis Date    Depression     PTSD (post-traumatic stress disorder)        History reviewed  No pertinent surgical history  Family History   Problem Relation Age of Onset    Lung cancer Mother     Diabetes type II Father      I have reviewed and agree with the history as documented  Social History     Tobacco Use    Smoking status: Current Every Day Smoker     Packs/day: 0 50     Types: Cigarettes    Smokeless tobacco: Never Used   Substance Use Topics    Alcohol use: Yes     Comment: occasional    Drug use: Yes     Types: Marijuana        Review of Systems   All other systems reviewed and are negative  Physical Exam  Physical Exam   Constitutional: He appears well-developed and well-nourished  HENT:   Head: Normocephalic and atraumatic  Eyes: Conjunctivae and EOM are normal    Neck: Normal range of motion  Neck supple  Cardiovascular: Normal rate and regular rhythm  Pulmonary/Chest: Effort normal and breath sounds normal    Abdominal: Soft  There is no tenderness  Musculoskeletal: Normal range of motion  He exhibits no deformity  Neurological: He is alert  No focal deficit   Skin: Skin is warm and dry  Psychiatric: He has a normal mood and affect  His behavior is normal    Nursing note and vitals reviewed  Vital Signs  ED Triage Vitals [09/28/19 1834]   Temperature Pulse Respirations Blood Pressure SpO2   98 1 °F (36 7 °C) 94 18 138/84 100 %      Temp Source Heart Rate Source Patient Position - Orthostatic VS BP Location FiO2 (%)   Temporal Monitor Sitting Left arm --      Pain Score       No Pain           Vitals:    09/28/19 1834   BP: 138/84   Pulse: 94   Patient Position - Orthostatic VS: Sitting         Visual Acuity      ED Medications  Medications - No data to display    Diagnostic Studies  Results Reviewed     Procedure Component Value Units Date/Time    Rapid drug screen, urine [[id number]]  (Abnormal) Collected:  09/28/19 1832    Lab Status:  Final result Specimen:  Urine, Clean Catch Updated:  09/28/19 1859     Amph/Meth UR Positive     Barbiturate Ur Negative     Benzodiazepine Urine Negative     Cocaine Urine Negative     Methadone Urine Negative     Opiate Urine Negative     PCP Ur Negative     THC Urine Positive    Narrative:       Presumptive report  If requested, specimen will be sent to reference lab for confirmation  FOR MEDICAL PURPOSES ONLY  IF CONFIRMATION NEEDED PLEASE CONTACT THE LAB WITHIN 5 DAYS      Drug Screen Cutoff Levels:  AMPHETAMINE/METHAMPHETAMINES  1000 ng/mL  BARBITURATES     200 ng/mL  BENZODIAZEPINES     200 ng/mL  COCAINE      300 ng/mL  METHADONE      300 ng/mL  OPIATES      300 ng/mL  PHENCYCLIDINE     25 ng/mL  THC       50 ng/mL      POCT alcohol breath test [[id number]]  (Normal) Resulted:  09/28/19 1831    Lab Status:  Final result Updated:  09/28/19 1831     EXTBreath Alcohol 0 000                 No orders to display              Procedures  Procedures       ED Course                               MDM  Number of Diagnoses or Management Options  Encounter for psychological evaluation:   Diagnosis management comments: Presents with statements of self-harm/suicide  Afebrile  VSS  Exam reveals no concerning findings  UDS positive for amphetamines and THC  Breath alcohol is 0  Patient was seen and evaluated in the emergency department by behavioral health specialist who recommends no acute intervention or placement at the time of this report  No strong indication to do otherwise at the time of this report  Patient reports he has a good family support network and has no intention of hurting himself or anybody else  Okay for discharge home with instructions for follow-up and signs and symptoms that would warrant return to the emergency department  Disposition  Final diagnoses:   Encounter for psychological evaluation     Time reflects when diagnosis was documented in both MDM as applicable and the Disposition within this note     Time User Action Codes Description Comment    9/28/2019  8:03 PM Cori Wall Add [Z00 8] Encounter for psychological evaluation       ED Disposition     ED Disposition Condition Date/Time Comment    Discharge Good Sat Sep 28, 2019  8:03 PM Román Cross  discharge to home/self care  Follow-up Information     Follow up With Specialties Details Why Contact Info    [facility] [place], [address] Nurse Practitioner Call in 2 days To schedule an appointment for re-evaluation [address] [place] [address]  [phone]      Behavioral health resources  Call in 2 days To schedule an appointment for re-evaluation           Patient's Medications   Discharge Prescriptions    No medications on file     No discharge procedures on file      ED Provider  Electronically Signed by           Nguyễn Noguera DO  09/28/19 2005

## 2019-09-29 NOTE — ED NOTES
Completed 302 faxed to [place];   Crisis Alert Form for telephone support follow-up faxed to [facility]

## 2022-10-04 ENCOUNTER — OFFICE VISIT (OUTPATIENT)
Dept: FAMILY MEDICINE CLINIC | Facility: CLINIC | Age: 32
End: 2022-10-04
Payer: COMMERCIAL

## 2022-10-04 VITALS
DIASTOLIC BLOOD PRESSURE: 80 MMHG | BODY MASS INDEX: 31.99 KG/M2 | SYSTOLIC BLOOD PRESSURE: 126 MMHG | HEIGHT: 73 IN | OXYGEN SATURATION: 97 % | TEMPERATURE: 97.8 F | WEIGHT: 241.4 LBS | HEART RATE: 61 BPM

## 2022-10-04 DIAGNOSIS — Z00.00 ANNUAL PHYSICAL EXAM: Primary | ICD-10-CM

## 2022-10-04 DIAGNOSIS — Z30.09 VASECTOMY EVALUATION: ICD-10-CM

## 2022-10-04 DIAGNOSIS — R22.0 MASS OF FACE: ICD-10-CM

## 2022-10-04 DIAGNOSIS — Z23 NEEDS FLU SHOT: ICD-10-CM

## 2022-10-04 PROCEDURE — 99385 PREV VISIT NEW AGE 18-39: CPT | Performed by: NURSE PRACTITIONER

## 2022-10-04 NOTE — PATIENT INSTRUCTIONS

## 2022-10-04 NOTE — PROGRESS NOTES
[address] [facility]    NAME: Lyn Her  AGE: 34 y o  SEX: male  : 1990     DATE: 10/4/2022     Assessment and Plan:     Problem List Items Addressed This Visit    None     Visit Diagnoses     Annual physical exam    -  Primary    Vasectomy evaluation        Relevant Orders    Ambulatory Referral to Urology    Needs flu shot        Relevant Orders    influenza vaccine, quadrivalent, 0 5 mL, preservative-free, for adult and pediatric patients 6 mos+ (AFLURIA, FLUARIX, FLULAVAL, FLUZONE)    Mass of face        Relevant Orders    Ambulatory Referral to Dermatology    BMI 31 0-31 9,adult              Immunizations and preventive care screenings were discussed with patient today  Appropriate education was printed on patient's after visit summary  Counseling:  · Dental Health: discussed importance of regular tooth brushing, flossing, and dental visits  BMI Counseling: Body mass index is 31 85 kg/m²  The BMI is above normal  Nutrition recommendations include encouraging healthy choices of fruits and vegetables  Rationale for BMI follow-up plan is due to patient being overweight or obese  Referral placed for urology for a vasectomy evaluation  Referral placed for dermatology for evaluation and removal of his cyst     He declined blood work today  Return in about 1 year (around 10/4/2023)  Chief Complaint:     Chief Complaint   Patient presents with    Physical Exam    BMI follow up due      History of Present Illness:     Adult Annual Physical   Patient here for a comprehensive physical exam  The patient reports no acute issues, would like a vasectomy  Has a cyst on his left temple that is increasing in size       Diet and Physical Activity  · Diet/Nutrition: well balanced diet  · Exercise: no formal exercise        Depression Screening  PHQ-2/9 Depression Screening         General Health  · Sleep: sleeps well  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  · Hearing: normal - bilateral   · Vision: most recent eye exam <1 year ago and wears glasses  · Dental: is seeing an oral surgeon  [facility]  · History of STDs?: no      Review of Systems:     Review of Systems   Constitutional: Negative  Respiratory: Negative  Cardiovascular: Negative  Skin:        See HPI   Neurological: Negative  All other systems reviewed and are negative  Past Medical History:     Past Medical History:   Diagnosis Date    Depression     PTSD (post-traumatic stress disorder)       Past Surgical History:     History reviewed  No pertinent surgical history     Social History:     Social History     Socioeconomic History    Marital status: /Civil Union     Spouse name: None    Number of children: 2    Years of education: None    Highest education level: None   Occupational History    Occupation: Employed    Tobacco Use    Smoking status: Current Every Day Smoker     Packs/day: 0 50     Types: Cigarettes    Smokeless tobacco: Never Used   Substance and Sexual Activity    Alcohol use: Yes     Comment: occasional    Drug use: Yes     Types: Marijuana    Sexual activity: Yes   Other Topics Concern    None   Social History Narrative    None     Social Determinants of Health     Financial Resource Strain: Not on file   Food Insecurity: Not on file   Transportation Needs: Not on file   Physical Activity: Not on file   Stress: Not on file   Social Connections: Not on file   Intimate Partner Violence: Not on file   Housing Stability: Not on file      Family History:     Family History   Problem Relation Age of Onset    Lung cancer Mother     Diabetes type II Father       Current Medications:     Current Outpatient Medications   Medication Sig Dispense Refill    omeprazole (PriLOSEC) 20 mg delayed release capsule Take 1 capsule (20 mg total) by mouth daily (Patient not taking: No sig reported) 30 capsule 5 No current facility-administered medications for this visit  Allergies: Allergies   Allergen Reactions    Codeine Anaphylaxis     Anaphylaxis    Tramadol     Cyclobenzaprine Rash      Physical Exam:     /80 (BP Location: Right arm, Patient Position: Sitting)   Pulse 61   Temp 97 8 °F (36 6 °C)   Ht 6' 1" (1 854 m)   Wt 109 kg (241 lb 6 4 oz)   SpO2 97%   BMI 31 85 kg/m²     Physical Exam  Constitutional:       Appearance: Normal appearance  HENT:      Head: Normocephalic and atraumatic  Cardiovascular:      Rate and Rhythm: Normal rate and regular rhythm  Pulses:           Dorsalis pedis pulses are 2+ on the right side and 2+ on the left side  Heart sounds: No murmur heard  No gallop  Pulmonary:      Effort: Pulmonary effort is normal  No respiratory distress  Breath sounds: Normal breath sounds  No wheezing or rales  Musculoskeletal:      Cervical back: Neck supple  Feet:      Right foot:      Skin integrity: No ulcer, skin breakdown, erythema, warmth, callus or dry skin  Left foot:      Skin integrity: No ulcer, skin breakdown, erythema, warmth, callus or dry skin  Neurological:      General: No focal deficit present  Mental Status: He is alert and oriented to person, place, and time  Mental status is at baseline  Psychiatric:         Mood and Affect: Mood normal          Behavior: Behavior normal          Thought Content:  Thought content normal          Judgment: Judgment normal           BARBIE Javier   [facility]

## 2023-08-17 ENCOUNTER — OFFICE VISIT (OUTPATIENT)
Dept: FAMILY MEDICINE CLINIC | Facility: CLINIC | Age: 33
End: 2023-08-17
Payer: COMMERCIAL

## 2023-08-17 VITALS
HEART RATE: 67 BPM | DIASTOLIC BLOOD PRESSURE: 78 MMHG | WEIGHT: 212 LBS | BODY MASS INDEX: 28.1 KG/M2 | OXYGEN SATURATION: 97 % | HEIGHT: 73 IN | SYSTOLIC BLOOD PRESSURE: 146 MMHG

## 2023-08-17 DIAGNOSIS — R41.3 MEMORY DIFFICULTIES: ICD-10-CM

## 2023-08-17 DIAGNOSIS — R53.83 OTHER FATIGUE: ICD-10-CM

## 2023-08-17 DIAGNOSIS — R41.840 ATTENTION AND CONCENTRATION DEFICIT: Primary | ICD-10-CM

## 2023-08-17 DIAGNOSIS — R45.89 FIDGETING: ICD-10-CM

## 2023-08-17 PROCEDURE — 99213 OFFICE O/P EST LOW 20 MIN: CPT | Performed by: NURSE PRACTITIONER

## 2023-08-17 RX ORDER — DEXTROAMPHETAMINE SACCHARATE, AMPHETAMINE ASPARTATE MONOHYDRATE, DEXTROAMPHETAMINE SULFATE AND AMPHETAMINE SULFATE 2.5; 2.5; 2.5; 2.5 MG/1; MG/1; MG/1; MG/1
10 CAPSULE, EXTENDED RELEASE ORAL EVERY MORNING
Qty: 30 CAPSULE | Refills: 0 | Status: SHIPPED | OUTPATIENT
Start: 2023-08-17

## 2023-08-17 NOTE — PROGRESS NOTES
Name: Gilberto Bolanos. : 1990      MRN: [id number]  Encounter Provider: BARBIE Valenzuela  Encounter Date: 2023   Encounter department: [facility]    Assessment & Plan     1. Attention and concentration deficit  -     amphetamine-dextroamphetamine (ADDERALL XR, 10MG,) 10 MG 24 hr capsule; Take 1 capsule (10 mg total) by mouth every morning Max Daily Amount: 10 mg    2. Memory difficulties  -     amphetamine-dextroamphetamine (ADDERALL XR, 10MG,) 10 MG 24 hr capsule; Take 1 capsule (10 mg total) by mouth every morning Max Daily Amount: 10 mg    3. Fidgeting  -     amphetamine-dextroamphetamine (ADDERALL XR, 10MG,) 10 MG 24 hr capsule; Take 1 capsule (10 mg total) by mouth every morning Max Daily Amount: 10 mg    4. Other fatigue  -     amphetamine-dextroamphetamine (ADDERALL XR, 10MG,) 10 MG 24 hr capsule; Take 1 capsule (10 mg total) by mouth every morning Max Daily Amount: 10 mg    Adult ADHD self-report scale (ASRS-v.1) completed. He answered between sometimes to very often for 4/6 questions in Part A. He answered 7/12 questions in part B with rarely, often, or very often. Per the checklist he is having difficulty getting things in order to complete tasks. He is having issues remembering appointments and getting tasks started on time. He is tending to make careless mistakes with projects, and has difficulty focusing on repetitive work. He also has difficulty waiting his turn in conversations and also other circumstances. Subjective      Here today to discuss his ongoing issues with concentration and memory. Reports they have worsened in the past 1.5 years since starting his current job. He has a past hx of attention issues and forgetfulness. He admits to using substances not prescribed in the past to help with this. Review of Systems   Constitutional: Negative. Respiratory: Negative. Cardiovascular: Negative.     Neurological: Negative. Psychiatric/Behavioral: Positive for behavioral problems and decreased concentration. The patient is nervous/anxious and is hyperactive. All other systems reviewed and are negative. Current Outpatient Medications on File Prior to Visit   Medication Sig   • [DISCONTINUED] omeprazole (PriLOSEC) 20 mg delayed release capsule Take 1 capsule (20 mg total) by mouth daily       Objective     /78 (BP Location: Left arm, Patient Position: Sitting, Cuff Size: Standard)   Pulse 67   Ht 6' 1" (1.854 m)   Wt 96.2 kg (212 lb)   SpO2 97%   BMI 27.97 kg/m²     Physical Exam  Constitutional:       Appearance: Normal appearance. Neurological:      General: No focal deficit present. Mental Status: He is alert and oriented to person, place, and time. Mental status is at baseline. Psychiatric:         Mood and Affect: Mood normal.         Thought Content:  Thought content normal.         Judgment: Judgment normal.       BARBIE Crane

## 2023-08-21 ENCOUNTER — TELEPHONE (OUTPATIENT)
Dept: FAMILY MEDICINE CLINIC | Facility: CLINIC | Age: 33
End: 2023-08-21

## 2023-08-21 NOTE — TELEPHONE ENCOUNTER
Prior mediication request submitted to pt's insurance plan for authorization.   Key: [id number]     Med: amphetamine-dextroamphetamine (ADDERALL XR, 10MG,) 10 MG 24 hr capsule     Awaiting approval/denial response

## 2023-09-05 ENCOUNTER — TELEPHONE (OUTPATIENT)
Dept: FAMILY MEDICINE CLINIC | Facility: CLINIC | Age: 33
End: 2023-09-05

## 2023-09-05 NOTE — TELEPHONE ENCOUNTER
Prior auth resent to insurance for rereview. Pending clinical review. All office visit notes, etc sent to insurance.      [facility]ial [facility]

## 2023-09-07 ENCOUNTER — TELEPHONE (OUTPATIENT)
Dept: FAMILY MEDICINE CLINIC | Facility: CLINIC | Age: 33
End: 2023-09-07

## 2023-09-12 ENCOUNTER — TELEPHONE (OUTPATIENT)
Dept: FAMILY MEDICINE CLINIC | Facility: CLINIC | Age: 33
End: 2023-09-12

## 2023-10-05 NOTE — PROGRESS NOTES
[address]  PRIMARY CARE    NAME: Sharif Low. AGE: 28 y.o. SEX: male  : 1990     DATE: 10/6/2023     Assessment and Plan:     Problem List Items Addressed This Visit        Other    Fatigue     Improving with ability to get to bed on time after working hours. Attention and concentration deficit - Primary      PDMP reviewed with no red flags identified. Side effects and possible risk of misuse reviewed in note. Discussed family history of substance abuse issues  We did review his past drug use history - last use was . He needs to remain clean due to his job requirements. He reports improvement with his concentration at work with the medication, notes that he still has some moments with decreased focus and memory. Will increase his Adderall to 15 mg. Relevant Medications    amphetamine-dextroamphetamine (ADDERALL XR, 15MG,) 15 MG 24 hr capsule   Other Visit Diagnoses     Annual physical exam        BMI 27.0-27.9,adult              Immunizations and preventive care screenings were discussed with patient today. Appropriate education was printed on patient's after visit summary. Counseling:  Dental Health: discussed importance of regular tooth brushing, flossing, and dental visits. BMI Counseling: Body mass index is 27.34 kg/m². The BMI is above normal. Nutrition recommendations include encouraging healthy choices of fruits and vegetables. Rationale for BMI follow-up plan is due to patient being overweight or obese. Depression Screening and Follow-up Plan: Patient was screened for depression during today's encounter. They screened negative with a PHQ-2 score of 2. Return in about 4 months (around 2024).      Chief Complaint:     Chief Complaint   Patient presents with   • Physical Exam   • Care Gap Request     BMI follow up due   HIV and Hep C due   Flu vaccine due - patient declined         History of Present Illness:     Adult Annual Physical   Patient here for a comprehensive physical exam. The patient reports no problems. Diet and Physical Activity  Diet/Nutrition: well balanced diet. Exercise: no formal exercise. Depression Screening  PHQ-2/9 Depression Screening    Little interest or pleasure in doing things: 1 - several days  Feeling down, depressed, or hopeless: 1 - several days  PHQ-2 Score: 2  PHQ-2 Interpretation: Negative depression screen       General Health  Sleep: sleeps well. Hearing: normal - bilateral.  Vision: no vision problems and wears glasses. Dental: brushes teeth twice daily.  Health  History of STDs?: no.     Review of Systems:     Review of Systems   Constitutional: Negative. Respiratory: Negative. Cardiovascular: Negative. Neurological: Negative. All other systems reviewed and are negative. Past Medical History:     Past Medical History:   Diagnosis Date   • Depression    • PTSD (post-traumatic stress disorder)       Past Surgical History:     History reviewed. No pertinent surgical history.    Social History:     Social History     Socioeconomic History   • Marital status: /Civil Union     Spouse name: None   • Number of children: 2   • Years of education: None   • Highest education level: None   Occupational History   • Occupation: Employed    Tobacco Use   • Smoking status: Every Day     Packs/day: 0.50     Types: Cigarettes   • Smokeless tobacco: Never   Substance and Sexual Activity   • Alcohol use: Yes     Comment: occasional   • Drug use: Yes     Types: Marijuana   • Sexual activity: Yes   Other Topics Concern   • None   Social History Narrative   • None     Social Determinants of Health     Financial Resource Strain: Not on file   Food Insecurity: Not on file   Transportation Needs: Not on file   Physical Activity: Not on file   Stress: Not on file   Social Connections: Not on file   Intimate Partner Violence: Not on file   Housing Stability: Not on file      Family History:     Family History   Problem Relation Age of Onset   • Lung cancer Mother    • Diabetes type II Father       Current Medications:     Current Outpatient Medications   Medication Sig Dispense Refill   • amphetamine-dextroamphetamine (ADDERALL XR, 15MG,) 15 MG 24 hr capsule Take 1 capsule (15 mg total) by mouth every morning Max Daily Amount: 15 mg 30 capsule 0     No current facility-administered medications for this visit. Allergies: Allergies   Allergen Reactions   • Codeine Anaphylaxis     Anaphylaxis   • Tramadol    • Cyclobenzaprine Rash      Physical Exam:     /60 (BP Location: Right arm, Patient Position: Sitting, Cuff Size: Standard)   Pulse 76   Ht 6' 1" (1.854 m)   Wt 94 kg (207 lb 3.2 oz)   SpO2 99%   BMI 27.34 kg/m²     Physical Exam  Constitutional:       Appearance: Normal appearance. HENT:      Head: Normocephalic and atraumatic. Cardiovascular:      Rate and Rhythm: Normal rate and regular rhythm. Pulses:           Dorsalis pedis pulses are 2+ on the right side and 2+ on the left side. Heart sounds: No murmur heard. No gallop. Pulmonary:      Effort: Pulmonary effort is normal. No respiratory distress. Breath sounds: Normal breath sounds. No wheezing or rales. Musculoskeletal:      Cervical back: Neck supple. Feet:      Right foot:      Skin integrity: No ulcer, skin breakdown, erythema, warmth, callus or dry skin. Left foot:      Skin integrity: No ulcer, skin breakdown, erythema, warmth, callus or dry skin. Neurological:      General: No focal deficit present. Mental Status: He is alert and oriented to person, place, and time. Mental status is at baseline. Psychiatric:         Mood and Affect: Mood normal.         Behavior: Behavior normal.         Thought Content:  Thought content normal.         Judgment: Judgment normal.          Ghulam Alicia [address]

## 2023-10-06 ENCOUNTER — OFFICE VISIT (OUTPATIENT)
Dept: FAMILY MEDICINE CLINIC | Facility: CLINIC | Age: 33
End: 2023-10-06
Payer: COMMERCIAL

## 2023-10-06 VITALS
DIASTOLIC BLOOD PRESSURE: 60 MMHG | WEIGHT: 207.2 LBS | HEART RATE: 76 BPM | BODY MASS INDEX: 27.46 KG/M2 | SYSTOLIC BLOOD PRESSURE: 134 MMHG | OXYGEN SATURATION: 99 % | HEIGHT: 73 IN

## 2023-10-06 DIAGNOSIS — R41.840 ATTENTION AND CONCENTRATION DEFICIT: Primary | ICD-10-CM

## 2023-10-06 DIAGNOSIS — Z00.00 ANNUAL PHYSICAL EXAM: ICD-10-CM

## 2023-10-06 DIAGNOSIS — R53.83 OTHER FATIGUE: ICD-10-CM

## 2023-10-06 PROBLEM — R19.7 DIARRHEA OF PRESUMED INFECTIOUS ORIGIN: Status: RESOLVED | Noted: 2018-07-31 | Resolved: 2023-10-06

## 2023-10-06 PROBLEM — R11.2 NON-INTRACTABLE VOMITING WITH NAUSEA: Status: RESOLVED | Noted: 2018-07-31 | Resolved: 2023-10-06

## 2023-10-06 PROCEDURE — 99395 PREV VISIT EST AGE 18-39: CPT | Performed by: NURSE PRACTITIONER

## 2023-10-06 RX ORDER — DEXTROAMPHETAMINE SACCHARATE, AMPHETAMINE ASPARTATE MONOHYDRATE, DEXTROAMPHETAMINE SULFATE AND AMPHETAMINE SULFATE 3.75; 3.75; 3.75; 3.75 MG/1; MG/1; MG/1; MG/1
15 CAPSULE, EXTENDED RELEASE ORAL EVERY MORNING
Qty: 30 CAPSULE | Refills: 0 | Status: SHIPPED | OUTPATIENT
Start: 2023-10-06

## 2023-10-06 NOTE — ASSESSMENT & PLAN NOTE
PDMP reviewed with no red flags identified. Side effects and possible risk of misuse reviewed in note. Discussed family history of substance abuse issues  We did review his past drug use history - last use was fall of 2021. He needs to remain clean due to his job requirements. He reports improvement with his concentration at work with the medication, notes that he still has some moments with decreased focus and memory. Will increase his Adderall to 15 mg.

## 2023-10-09 ENCOUNTER — TELEPHONE (OUTPATIENT)
Dept: FAMILY MEDICINE CLINIC | Facility: CLINIC | Age: 33
End: 2023-10-09

## 2023-10-09 NOTE — TELEPHONE ENCOUNTER
Prior authorization needed for patient's amphetamine-dextroamphetamine 15MG ER capsules. Prior authorization form filled out and clinicals faxed over to [facility].      Awaiting approval/denial.

## 2023-10-10 ENCOUNTER — TELEPHONE (OUTPATIENT)
Dept: FAMILY MEDICINE CLINIC | Facility: CLINIC | Age: 33
End: 2023-10-10

## 2023-10-10 NOTE — TELEPHONE ENCOUNTER
[facility] only had 20 pills in stock for patient's Adderall and stated he would have to forfeit the rest of the script. So when the 20 days is up he needs a new script submitted for 30 days and the pharmacy can refill it then and it has to say brand necessary.

## 2023-10-27 DIAGNOSIS — R41.840 ATTENTION AND CONCENTRATION DEFICIT: ICD-10-CM

## 2023-10-27 RX ORDER — DEXTROAMPHETAMINE SACCHARATE, AMPHETAMINE ASPARTATE MONOHYDRATE, DEXTROAMPHETAMINE SULFATE AND AMPHETAMINE SULFATE 3.75; 3.75; 3.75; 3.75 MG/1; MG/1; MG/1; MG/1
15 CAPSULE, EXTENDED RELEASE ORAL EVERY MORNING
Qty: 30 CAPSULE | Refills: 0 | Status: SHIPPED | OUTPATIENT
Start: 2023-10-27

## 2023-11-27 DIAGNOSIS — R41.840 ATTENTION AND CONCENTRATION DEFICIT: ICD-10-CM

## 2023-11-27 RX ORDER — DEXTROAMPHETAMINE SACCHARATE, AMPHETAMINE ASPARTATE MONOHYDRATE, DEXTROAMPHETAMINE SULFATE AND AMPHETAMINE SULFATE 3.75; 3.75; 3.75; 3.75 MG/1; MG/1; MG/1; MG/1
15 CAPSULE, EXTENDED RELEASE ORAL EVERY MORNING
Qty: 30 CAPSULE | Refills: 0 | Status: SHIPPED | OUTPATIENT
Start: 2023-11-27

## 2023-12-27 DIAGNOSIS — R41.840 ATTENTION AND CONCENTRATION DEFICIT: ICD-10-CM

## 2023-12-27 RX ORDER — DEXTROAMPHETAMINE SACCHARATE, AMPHETAMINE ASPARTATE MONOHYDRATE, DEXTROAMPHETAMINE SULFATE AND AMPHETAMINE SULFATE 3.75; 3.75; 3.75; 3.75 MG/1; MG/1; MG/1; MG/1
15 CAPSULE, EXTENDED RELEASE ORAL EVERY MORNING
Qty: 30 CAPSULE | Refills: 0 | Status: SHIPPED | OUTPATIENT
Start: 2023-12-27

## 2024-01-25 DIAGNOSIS — R41.840 ATTENTION AND CONCENTRATION DEFICIT: ICD-10-CM

## 2024-01-25 RX ORDER — DEXTROAMPHETAMINE SACCHARATE, AMPHETAMINE ASPARTATE MONOHYDRATE, DEXTROAMPHETAMINE SULFATE AND AMPHETAMINE SULFATE 3.75; 3.75; 3.75; 3.75 MG/1; MG/1; MG/1; MG/1
15 CAPSULE, EXTENDED RELEASE ORAL EVERY MORNING
Qty: 30 CAPSULE | Refills: 0 | Status: SHIPPED | OUTPATIENT
Start: 2024-01-25

## 2024-01-25 NOTE — TELEPHONE ENCOUNTER
Reason for call:   [x] Refill   [] Prior Auth  [] Other:     Office:   [x] PCP/Provider - Eden Mcgrath  [] Specialty/Provider -     Medication: amphetamine-dextroamphetamine    Dose/Frequency: 15 mg once daily    Quantity: 30    Pharmacy: [facility] [place]    Does the patient have enough for 3 days?   [x] Yes   [] No - Send as HP to POD

## 2024-12-02 ENCOUNTER — TELEPHONE (OUTPATIENT)
Age: 34
End: 2024-12-02

## 2024-12-02 ENCOUNTER — OFFICE VISIT (OUTPATIENT)
Dept: FAMILY MEDICINE CLINIC | Facility: CLINIC | Age: 34
End: 2024-12-02
Payer: COMMERCIAL

## 2024-12-02 VITALS
WEIGHT: 241.6 LBS | SYSTOLIC BLOOD PRESSURE: 104 MMHG | HEIGHT: 73 IN | OXYGEN SATURATION: 98 % | HEART RATE: 64 BPM | DIASTOLIC BLOOD PRESSURE: 72 MMHG | BODY MASS INDEX: 32.02 KG/M2

## 2024-12-02 DIAGNOSIS — Z00.00 ANNUAL PHYSICAL EXAM: Primary | ICD-10-CM

## 2024-12-02 DIAGNOSIS — R41.840 ATTENTION AND CONCENTRATION DEFICIT: ICD-10-CM

## 2024-12-02 DIAGNOSIS — F43.9 STRESS AT HOME: ICD-10-CM

## 2024-12-02 PROCEDURE — 99395 PREV VISIT EST AGE 18-39: CPT | Performed by: NURSE PRACTITIONER

## 2024-12-02 RX ORDER — DEXTROAMPHETAMINE SACCHARATE, AMPHETAMINE ASPARTATE MONOHYDRATE, DEXTROAMPHETAMINE SULFATE AND AMPHETAMINE SULFATE 3.75; 3.75; 3.75; 3.75 MG/1; MG/1; MG/1; MG/1
15 CAPSULE, EXTENDED RELEASE ORAL EVERY MORNING
Qty: 30 CAPSULE | Refills: 0 | Status: SHIPPED | OUTPATIENT
Start: 2024-12-02

## 2024-12-02 NOTE — PATIENT INSTRUCTIONS
"Patient Education     Routine physical for adults   The Basics   Written by the doctors and editors at [facility]   What is a physical? -- A physical is a routine visit, or \"check-up,\" with your doctor. You might also hear it called a \"wellness visit\" or \"preventive visit.\"  During each visit, the doctor will:   Ask about your physical and mental health   Ask about your habits, behaviors, and lifestyle   Do an exam   Give you vaccines if needed   Talk to you about any medicines you take   Give advice about your health   Answer your questions  Getting regular check-ups is an important part of taking care of your health. It can help your doctor find and treat any problems you have. But it's also important for preventing health problems.  A routine physical is different from a \"sick visit.\" A sick visit is when you see a doctor because of a health concern or problem. Since physicals are scheduled ahead of time, you can think about what you want to ask the doctor.  How often should I get a physical? -- It depends on your age and health. In general, for people age 21 years and older:   If you are younger than 50 years, you might be able to get a physical every 3 years.   If you are 50 years or older, your doctor might recommend a physical every year.  If you have an ongoing health condition, like diabetes or high blood pressure, your doctor will probably want to see you more often.  What happens during a physical? -- In general, each visit will include:   Physical exam - The doctor or nurse will check your height, weight, heart rate, and blood pressure. They will also look at your eyes and ears. They will ask about how you are feeling and whether you have any symptoms that bother you.   Medicines - It's a good idea to bring a list of all the medicines you take to each doctor visit. Your doctor will talk to you about your medicines and answer any questions. Tell them if you are having any side effects that bother you. You " "should also tell them if you are having trouble paying for any of your medicines.   Habits and behaviors - This includes:   Your diet   Your exercise habits   Whether you smoke, drink alcohol, or use drugs   Whether you are sexually active   Whether you feel safe at home  Your doctor will talk to you about things you can do to improve your health and lower your risk of health problems. They will also offer help and support. For example, if you want to quit smoking, they can give you advice and might prescribe medicines. If you want to improve your diet or get more physical activity, they can help you with this, too.   Lab tests, if needed - The tests you get will depend on your age and situation. For example, your doctor might want to check your:   Cholesterol   Blood sugar   Iron level   Vaccines - The recommended vaccines will depend on your age, health, and what vaccines you already had. Vaccines are very important because they can prevent certain serious or deadly infections.   Discussion of screening - \"Screening\" means checking for diseases or other health problems before they cause symptoms. Your doctor can recommend screening based on your age, risk, and preferences. This might include tests to check for:   Cancer, such as breast, prostate, cervical, ovarian, colorectal, prostate, lung, or skin cancer   Sexually transmitted infections, such as chlamydia and gonorrhea   Mental health conditions like depression and anxiety  Your doctor will talk to you about the different types of screening tests. They can help you decide which screenings to have. They can also explain what the results might mean.   Answering questions - The physical is a good time to ask the doctor or nurse questions about your health. If needed, they can refer you to other doctors or specialists, too.  Adults older than 65 years often need other care, too. As you get older, your doctor will talk to you about:   How to prevent falling at " home   Hearing or vision tests   Memory testing   How to take your medicines safely   Making sure that you have the help and support you need at home  All topics are updated as new evidence becomes available and our peer review process is complete.  This topic retrieved from [employer] on: May 02, 2024.  Topic 155960 Version 1.0  Release: 32.4.3 - C32.122  © 2024 UpToDate, Inc. and/or its affiliates. All rights reserved.  Consumer Information Use and Disclaimer   Disclaimer: This generalized information is a limited summary of diagnosis, treatment, and/or medication information. It is not meant to be comprehensive and should be used as a tool to help the user understand and/or assess potential diagnostic and treatment options. It does NOT include all information about conditions, treatments, medications, side effects, or risks that may apply to a specific patient. It is not intended to be medical advice or a substitute for the medical advice, diagnosis, or treatment of a health care provider based on the health care provider's examination and assessment of a patient's specific and unique circumstances. Patients must speak with a health care provider for complete information about their health, medical questions, and treatment options, including any risks or benefits regarding use of medications. This information does not endorse any treatments or medications as safe, effective, or approved for treating a specific patient. UpToDate, Inc. and its affiliates disclaim any warranty or liability relating to this information or the use thereof.The use of this information is governed by the Terms of Use, available at https://www.[employer].com/en/know/clinical-effectiveness-terms. 2024© UpToDate, Inc. and its affiliates and/or licensors. All rights reserved.  Copyright   © 2024 UpToDate, Inc. and/or its affiliates. All rights reserved.

## 2024-12-02 NOTE — TELEPHONE ENCOUNTER
PA for Amphetamine-Dextroamphet ER 15MG capsules APPROVED     Date(s) approved: 12/02/2024-12/02/2025    Case #[id number]    Patient advised by          []MyChart Message  []Phone call   []LMOM  []L/M to call office as no active Communication consent on file  [x]Unable to leave detailed message as VM not approved on Communication consent/MB full       Pharmacy advised by    [x]Fax  []Phone call    Approval letter scanned into Media Yes

## 2024-12-02 NOTE — TELEPHONE ENCOUNTER
PA for Amphetamine-Dextroamphet ER 15MG capsules SUBMITTED to [employer]    via    [x]CMM-KEY: [id number]  []Surescripts-Case ID #   []Availity-Auth ID # NDC #   []Faxed to plan   []Other website   []Phone call Case ID #     [x]PA sent as URGENT    All office notes, labs and other pertaining documents and studies sent. Clinical questions answered. Awaiting determination from insurance company.     Turnaround time for your insurance to make a decision on your Prior Authorization can take 7-21 business days.

## 2024-12-02 NOTE — ASSESSMENT & PLAN NOTE
Orders:    amphetamine-dextroamphetamine (ADDERALL XR, 15MG,) 15 MG 24 hr capsule; Take 1 capsule (15 mg total) by mouth every morning Max Daily Amount: 15 mg

## 2024-12-02 NOTE — PROGRESS NOTES
StressAdult Annual Physical  Name: Remberto Carrera Jr.      : 1990      MRN: [id number]  Encounter Provider: BARBIE Javier  Encounter Date: 2024   Encounter department: [facility]    Assessment & Plan  Annual physical exam         Attention and concentration deficit    Orders:    amphetamine-dextroamphetamine (ADDERALL XR, 15MG,) 15 MG 24 hr capsule; Take 1 capsule (15 mg total) by mouth every morning Max Daily Amount: 15 mg    Stress at home  Reports increased stress at home due to financial and home issues.  Unsure if his change in mood is because of that or if it is the lack of his adderall.  Will monitor. Adderall refilled today.  PDMP reviewed with no red flags identified.           Immunizations and preventive care screenings were discussed with patient today. Appropriate education was printed on patient's after visit summary.    Counseling:  Injury prevention: discussed safety/seat belts, safety helmets, smoke detectors, carbon monoxide detectors, and smoking near bedding or upholstery.      Depression Screening and Follow-up Plan: Patient's depression screening was positive with a PHQ-2 score of 4. Their PHQ-9 score was 12. Patient with underlying depression and was advised to continue current medications as prescribed.     Tobacco Cessation Counseling: Tobacco cessation counseling was not provided. The patient is sincerely urged to quit consumption of tobacco. He is not ready to quit tobacco.         History of Present Illness     Adult Annual Physical:  Patient presents for annual physical. History of ADHD - has been on Adderall in the past - stopped earlier in the year due insurance issues.  Reports he would like to restart as he feels it would be beneficial for him.  .     Diet and Physical Activity:  - Diet/Nutrition: well balanced diet.  - Exercise: no formal exercise.    Depression Screening:  - PHQ-2 Score: 4  - PHQ-9 Score: 12    General Health:  -  "Sleep: sleeps poorly. waking up angry and yelling at times.  - Hearing: normal hearing right ear and normal hearing left ear.  - Vision: no vision problems and wears glasses.  - Dental: brushes teeth twice daily.     Health:  - History of STDs: no.   - Urinary symptoms: none.     Advanced Care Planning:  - Has an advanced directive?: no    - Has a durable medical POA?: no    - ACP document given to patient?: no      Review of Systems   Constitutional: Negative.    Respiratory: Negative.     Cardiovascular: Negative.    Psychiatric/Behavioral:  Positive for sleep disturbance.      Current Outpatient Medications on File Prior to Visit   Medication Sig Dispense Refill    [DISCONTINUED] amphetamine-dextroamphetamine (ADDERALL XR, 15MG,) 15 MG 24 hr capsule Take 1 capsule (15 mg total) by mouth every morning Max Daily Amount: 15 mg 30 capsule 0     No current facility-administered medications on file prior to visit.        Objective   /72 (BP Location: Right arm, Patient Position: Sitting, Cuff Size: Large)   Pulse 64   Ht 6' 1\" (1.854 m)   Wt 110 kg (241 lb 9.6 oz)   SpO2 98%   BMI 31.88 kg/m²     Physical Exam  Constitutional:       Appearance: Normal appearance.   Cardiovascular:      Rate and Rhythm: Normal rate and regular rhythm.      Heart sounds: No murmur heard.     No gallop.   Pulmonary:      Effort: Pulmonary effort is normal. No respiratory distress.      Breath sounds: Normal breath sounds. No wheezing or rales.   Feet:      Right foot:      Skin integrity: No ulcer, skin breakdown, erythema, warmth, callus or dry skin.      Left foot:      Skin integrity: No ulcer, skin breakdown, erythema, warmth, callus or dry skin.   Neurological:      General: No focal deficit present.      Mental Status: He is alert and oriented to person, place, and time. Mental status is at baseline.   Psychiatric:         Mood and Affect: Mood normal.         Behavior: Behavior normal.         Thought Content: Thought " content normal.         Judgment: Judgment normal.

## 2024-12-30 DIAGNOSIS — R41.840 ATTENTION AND CONCENTRATION DEFICIT: ICD-10-CM

## 2024-12-30 RX ORDER — DEXTROAMPHETAMINE SACCHARATE, AMPHETAMINE ASPARTATE MONOHYDRATE, DEXTROAMPHETAMINE SULFATE AND AMPHETAMINE SULFATE 3.75; 3.75; 3.75; 3.75 MG/1; MG/1; MG/1; MG/1
15 CAPSULE, EXTENDED RELEASE ORAL EVERY MORNING
Qty: 30 CAPSULE | Refills: 0 | Status: SHIPPED | OUTPATIENT
Start: 2024-12-30

## 2024-12-30 NOTE — TELEPHONE ENCOUNTER
Reason for call: Eden Mcgrath, manage this medication! Patient has an appointment on 06/02/2025!    PA for Amphetamine-Dextroamphet ER 15MG capsules APPROVED         [x] Refill   [] Prior Auth  [] Other:     Office:   [x] PCP/Provider -   [] Specialty/Provider -     Medication:     amphetamine-dextroamphetamine (ADDERALL XR, 15MG,) 15 MG 24 hr capsule       Dose/Frequency: Take 1 capsule (15 mg total) by mouth every morning     Quantity: 30 capsule     Pharmacy: [id number] - [place], [place] - [address] [place], [place] [address] [facility] [phone]     Does the patient have enough for 3 days?   [] Yes   [x] No - Send as HP to POD

## 2025-01-22 DIAGNOSIS — R41.840 ATTENTION AND CONCENTRATION DEFICIT: ICD-10-CM

## 2025-01-22 RX ORDER — DEXTROAMPHETAMINE SACCHARATE, AMPHETAMINE ASPARTATE MONOHYDRATE, DEXTROAMPHETAMINE SULFATE AND AMPHETAMINE SULFATE 3.75; 3.75; 3.75; 3.75 MG/1; MG/1; MG/1; MG/1
15 CAPSULE, EXTENDED RELEASE ORAL EVERY MORNING
Qty: 30 CAPSULE | Refills: 0 | Status: SHIPPED | OUTPATIENT
Start: 2025-01-22

## 2025-02-24 DIAGNOSIS — R41.840 ATTENTION AND CONCENTRATION DEFICIT: ICD-10-CM

## 2025-02-24 RX ORDER — DEXTROAMPHETAMINE SACCHARATE, AMPHETAMINE ASPARTATE MONOHYDRATE, DEXTROAMPHETAMINE SULFATE AND AMPHETAMINE SULFATE 3.75; 3.75; 3.75; 3.75 MG/1; MG/1; MG/1; MG/1
15 CAPSULE, EXTENDED RELEASE ORAL EVERY MORNING
Qty: 30 CAPSULE | Refills: 0 | Status: SHIPPED | OUTPATIENT
Start: 2025-02-24 | End: 2025-03-07

## 2025-02-24 NOTE — TELEPHONE ENCOUNTER
Medication:     amphetamine-dextroamphetamine (ADDERALL XR, 15MG,) 15 MG 24 hr capsule       Dose/Frequency: Take 1 capsule (15 mg total) by mouth every morning Max Daily Amount: 15 mg     Quantity: 30    Pharmacy: [id number] - [place], [place] - [facility] [address]     Office:   [x] PCP/Provider -   [] Speciality/Provider -     Does the patient have enough for 3 days?   [] Yes   [x] No - Send as HP to POD

## 2025-03-03 ENCOUNTER — TELEMEDICINE (OUTPATIENT)
Dept: FAMILY MEDICINE CLINIC | Facility: CLINIC | Age: 35
End: 2025-03-03
Payer: COMMERCIAL

## 2025-03-03 VITALS — WEIGHT: 241.6 LBS | BODY MASS INDEX: 32.02 KG/M2 | HEIGHT: 73 IN

## 2025-03-03 DIAGNOSIS — R41.840 ATTENTION AND CONCENTRATION DEFICIT: Primary | ICD-10-CM

## 2025-03-03 PROCEDURE — 99213 OFFICE O/P EST LOW 20 MIN: CPT | Performed by: NURSE PRACTITIONER

## 2025-03-03 RX ORDER — DEXTROAMPHETAMINE SACCHARATE, AMPHETAMINE ASPARTATE MONOHYDRATE, DEXTROAMPHETAMINE SULFATE AND AMPHETAMINE SULFATE 5; 5; 5; 5 MG/1; MG/1; MG/1; MG/1
20 CAPSULE, EXTENDED RELEASE ORAL EVERY MORNING
Start: 2025-03-03 | End: 2025-03-06 | Stop reason: SDUPTHER

## 2025-03-03 NOTE — PROGRESS NOTES
"Virtual Regular VisitName: Remberto Carrera Jr.      : 1990      MRN: [id number]  Encounter Provider: BARBIE Javier  Encounter Date: 3/3/2025   Encounter department: [facility] PRIMARY CARE  :  Assessment & Plan  Attention and concentration deficit  Will increase does to 20 mg  Orders:  •  amphetamine-dextroamphetamine (ADDERALL XR, 20MG,) 20 MG 24 hr capsule; Take 1 capsule (20 mg total) by mouth every morning Max Daily Amount: 20 mg    Attention and concentration deficit               History of Present Illness     Currently taking 15 mg of adderall - has noted some need for increased dosing to help with concentration.        Review of Systems   Constitutional: Negative.    Respiratory: Negative.     Cardiovascular: Negative.    Psychiatric/Behavioral:  Positive for decreased concentration.    All other systems reviewed and are negative.      Objective   Ht 6' 1\" (1.854 m)   Wt 110 kg (241 lb 9.6 oz)   BMI 31.88 kg/m²     Physical Exam  Neurological:      Mental Status: He is alert and oriented to person, place, and time.         Administrative Statements   Encounter provider BARBIE Javier    The Patient is located at Home and in the following state in which I hold an active license [place].    The patient was identified by name and date of birth. Remberto REED Deandre KohliPamela was informed that this is a telemedicine visit and that the visit is being conducted through the Epic Embedded platform. He agrees to proceed..  My office door was closed. No one else was in the room.  He acknowledged consent and understanding of privacy and security of the video platform. The patient has agreed to participate and understands they can discontinue the visit at any time.    I have spent a total time of 15 minutes in caring for this patient on the day of the visit/encounter including Patient and family education, Importance of tx compliance, Risk factor reductions, Impressions, and Counseling / " Coordination of care, not including the time spent for establishing the audio/video connection.

## 2025-03-03 NOTE — ASSESSMENT & PLAN NOTE
Will increase does to 20 mg  Orders:  •  amphetamine-dextroamphetamine (ADDERALL XR, 20MG,) 20 MG 24 hr capsule; Take 1 capsule (20 mg total) by mouth every morning Max Daily Amount: 20 mg

## 2025-03-06 DIAGNOSIS — R41.840 ATTENTION AND CONCENTRATION DEFICIT: ICD-10-CM

## 2025-03-06 NOTE — TELEPHONE ENCOUNTER
Patient called the RX Refill Line. Message is being forwarded to the office.     Patient is requesting follow up on medication request for amphetamine-dextroamphetamine (ADDERALL XR, 20MG,) 20 MG 24 hr capsule. Script was written 3/3/2025 with not Pharmacy or quantity listed. Patient does not have enough medication to get through the weekend.    Please review.    amphetamine-dextroamphetamine (ADDERALL XR, 20MG,) 20 MG 24 hr capsule     Take 1 capsule (20 mg total) by mouth every morning Max Daily Amount: 20 mg   Quantity: 30 tablet Refills: 0    [id number] - [place], [place] - [address]  [address], [place] [address]  Phone: [phone]  Fax: [phone]      Please contact patient at [phone]

## 2025-03-07 RX ORDER — DEXTROAMPHETAMINE SACCHARATE, AMPHETAMINE ASPARTATE MONOHYDRATE, DEXTROAMPHETAMINE SULFATE AND AMPHETAMINE SULFATE 5; 5; 5; 5 MG/1; MG/1; MG/1; MG/1
20 CAPSULE, EXTENDED RELEASE ORAL EVERY MORNING
Qty: 30 CAPSULE | Refills: 0 | Status: SHIPPED | OUTPATIENT
Start: 2025-03-12

## 2025-03-07 RX ORDER — DEXTROAMPHETAMINE SACCHARATE, AMPHETAMINE ASPARTATE MONOHYDRATE, DEXTROAMPHETAMINE SULFATE AND AMPHETAMINE SULFATE 5; 5; 5; 5 MG/1; MG/1; MG/1; MG/1
20 CAPSULE, EXTENDED RELEASE ORAL EVERY MORNING
Qty: 30 CAPSULE | Refills: 0 | Status: SHIPPED | OUTPATIENT
Start: 2025-03-07 | End: 2025-03-07 | Stop reason: SDUPTHER

## 2025-03-07 NOTE — TELEPHONE ENCOUNTER
"The medication was in his chart with no meds dispensed or pharmacy because I changed it in his record at his appointment but requested the \"no print\" option - I did this because he is not due for his next dose of adderall - he only had the previous dose filled 2 weeks ago. I have sent the new dose to [facility] but it will not be dispensed for another two weeks.    "

## 2025-04-11 DIAGNOSIS — R41.840 ATTENTION AND CONCENTRATION DEFICIT: ICD-10-CM

## 2025-04-11 RX ORDER — DEXTROAMPHETAMINE SACCHARATE, AMPHETAMINE ASPARTATE MONOHYDRATE, DEXTROAMPHETAMINE SULFATE AND AMPHETAMINE SULFATE 5; 5; 5; 5 MG/1; MG/1; MG/1; MG/1
20 CAPSULE, EXTENDED RELEASE ORAL EVERY MORNING
Qty: 30 CAPSULE | Refills: 0 | Status: SHIPPED | OUTPATIENT
Start: 2025-04-11

## 2025-04-11 NOTE — TELEPHONE ENCOUNTER
Reason for call:   [] Refill   [] Prior Auth  [] Other:     Office:   [x] PCP/Provider - Eden Mcgrath  [] Specialty/Provider -     Medication: Adderral XR    Dose/Frequency: 20 mg Q AM    Quantity: 30    Ph[facility]: [facility] [place] [address] [facility]    Local Pharmacy   Does the patient have enough for 3 days?   [] Yes   [x] No - Send as HP to POD    Mail Away Pharmacy   Does the patient have enough for 10 days?   [] Yes   [] No - Send as HP to POD

## 2025-05-12 DIAGNOSIS — R41.840 ATTENTION AND CONCENTRATION DEFICIT: ICD-10-CM

## 2025-05-12 RX ORDER — DEXTROAMPHETAMINE SACCHARATE, AMPHETAMINE ASPARTATE MONOHYDRATE, DEXTROAMPHETAMINE SULFATE AND AMPHETAMINE SULFATE 5; 5; 5; 5 MG/1; MG/1; MG/1; MG/1
20 CAPSULE, EXTENDED RELEASE ORAL EVERY MORNING
Qty: 30 CAPSULE | Refills: 0 | Status: SHIPPED | OUTPATIENT
Start: 2025-05-12

## 2025-05-12 NOTE — TELEPHONE ENCOUNTER
Reason for call:   [x] Refill   [] Prior Auth  [] Other:     Office:   [x] PCP/Provider - BARBIE Javier [facility]   [] Specialty/Provider -     Medication: amphetamine-dextroamphetamine (ADDERALL XR )    Dose/Frequency: 20mg   One Daily    Quantity: 30    Pharmacy: [id number] [place] [facility]   Does the patient have enough for 3 days?   [] Yes   [x] No - Send as HP to POD    Mail Away Pharmacy   Does the patient have enough for 10 days?   [] Yes   [] No - Send as HP to POD

## 2025-06-06 ENCOUNTER — TELEPHONE (OUTPATIENT)
Dept: FAMILY MEDICINE CLINIC | Facility: CLINIC | Age: 35
End: 2025-06-06

## 2025-06-06 NOTE — TELEPHONE ENCOUNTER
Tried to leave voicemail to reschedule patients no show appointment from 6/6/25. Pt voicemail box is full.

## 2025-06-10 DIAGNOSIS — R41.840 ATTENTION AND CONCENTRATION DEFICIT: ICD-10-CM

## 2025-06-10 NOTE — TELEPHONE ENCOUNTER
Reason for call:   [x] Refill   [] Prior Auth  [] Other:     Office:   [x] PCP/Provider -   [] Specialty/Provider -     Medication: (ADDERALL XR, 20MG,) 20 MG 24 hr capsule     Dose/Frequency:  Take 1 capsule (20 mg total) by mouth every morning     Quantity: 30 capsule    Pharmacy: [id number] - [place], [place] -     Local Pharmacy   Does the patient have enough for 3 days?   [x] Yes   [] No - Send as HP to POD    Mail Away Pharmacy   Does the patient have enough for 10 days?   [] Yes   [] No - Send as HP to POD

## 2025-06-11 DIAGNOSIS — R41.840 ATTENTION AND CONCENTRATION DEFICIT: ICD-10-CM

## 2025-06-11 RX ORDER — DEXTROAMPHETAMINE SACCHARATE, AMPHETAMINE ASPARTATE MONOHYDRATE, DEXTROAMPHETAMINE SULFATE AND AMPHETAMINE SULFATE 5; 5; 5; 5 MG/1; MG/1; MG/1; MG/1
20 CAPSULE, EXTENDED RELEASE ORAL EVERY MORNING
Qty: 30 CAPSULE | Refills: 0 | Status: SHIPPED | OUTPATIENT
Start: 2025-06-11

## 2025-06-11 RX ORDER — DEXTROAMPHETAMINE SACCHARATE, AMPHETAMINE ASPARTATE MONOHYDRATE, DEXTROAMPHETAMINE SULFATE AND AMPHETAMINE SULFATE 5; 5; 5; 5 MG/1; MG/1; MG/1; MG/1
20 CAPSULE, EXTENDED RELEASE ORAL EVERY MORNING
Qty: 30 CAPSULE | Refills: 0 | Status: CANCELLED | OUTPATIENT
Start: 2025-06-11

## 2025-06-11 NOTE — TELEPHONE ENCOUNTER
Medication: amphetamine-dextroamphetamine (ADDERALL XR, 20MG,) 20 MG 24 hr capsule     Dose/Frequency: Take 1 capsule (20 mg total) by mouth every morning Max Daily Amount: 20 mg     Quantity: 30 capsule     Pharmacy: [id number] - [place], [place] - [address] [facility] [phone]     Office:   [x] PCP/Provider - Reese Awad PA-C    [] Speciality/Provider -     Does the patient have enough for 3 days?   [] Yes   [x] No - Send as HP to POD

## 2025-07-11 DIAGNOSIS — R41.840 ATTENTION AND CONCENTRATION DEFICIT: ICD-10-CM

## 2025-07-11 RX ORDER — DEXTROAMPHETAMINE SACCHARATE, AMPHETAMINE ASPARTATE MONOHYDRATE, DEXTROAMPHETAMINE SULFATE AND AMPHETAMINE SULFATE 5; 5; 5; 5 MG/1; MG/1; MG/1; MG/1
20 CAPSULE, EXTENDED RELEASE ORAL EVERY MORNING
Qty: 30 CAPSULE | Refills: 0 | Status: SHIPPED | OUTPATIENT
Start: 2025-07-11

## 2025-07-11 NOTE — TELEPHONE ENCOUNTER
Reason for call:   [x] Refill   [] Prior Auth  [] Other:     Office:   [x] PCP/Provider - [facility]  Authorized By: Reese Awad PA-C  [] Specialty/Provider -     Medication:     amphetamine-dextroamphetamine (ADDERALL XR, 20MG,) 20 MG 24 hr capsule 20 mg, Every morning         Pharmacy: [id number] - [place], [place] [address] [facility]   Does the patient have enough for 3 days?   [] Yes   [x] No - Send as HP to POD    Mail Away Pharmacy   Does the patient have enough for 10 days?   [] Yes   [] No - Send as HP to POD

## 2025-08-08 DIAGNOSIS — R41.840 ATTENTION AND CONCENTRATION DEFICIT: ICD-10-CM

## 2025-08-08 RX ORDER — DEXTROAMPHETAMINE SACCHARATE, AMPHETAMINE ASPARTATE MONOHYDRATE, DEXTROAMPHETAMINE SULFATE AND AMPHETAMINE SULFATE 5; 5; 5; 5 MG/1; MG/1; MG/1; MG/1
20 CAPSULE, EXTENDED RELEASE ORAL EVERY MORNING
Qty: 30 CAPSULE | Refills: 0 | Status: SHIPPED | OUTPATIENT
Start: 2025-08-08